# Patient Record
Sex: FEMALE | Race: ASIAN | NOT HISPANIC OR LATINO
[De-identification: names, ages, dates, MRNs, and addresses within clinical notes are randomized per-mention and may not be internally consistent; named-entity substitution may affect disease eponyms.]

---

## 2017-07-24 ENCOUNTER — APPOINTMENT (OUTPATIENT)
Dept: OBGYN | Facility: CLINIC | Age: 82
End: 2017-07-24

## 2017-07-24 VITALS
HEART RATE: 69 BPM | WEIGHT: 100 LBS | DIASTOLIC BLOOD PRESSURE: 94 MMHG | BODY MASS INDEX: 21.57 KG/M2 | HEIGHT: 57 IN | SYSTOLIC BLOOD PRESSURE: 204 MMHG

## 2017-07-24 DIAGNOSIS — B96.89 ACUTE VAGINITIS: ICD-10-CM

## 2017-07-24 DIAGNOSIS — N76.0 ACUTE VAGINITIS: ICD-10-CM

## 2017-10-30 ENCOUNTER — APPOINTMENT (OUTPATIENT)
Dept: OBGYN | Facility: CLINIC | Age: 82
End: 2017-10-30
Payer: MEDICARE

## 2017-10-30 ENCOUNTER — ASOB RESULT (OUTPATIENT)
Age: 82
End: 2017-10-30

## 2017-10-30 DIAGNOSIS — N81.4 UTEROVAGINAL PROLAPSE, UNSPECIFIED: ICD-10-CM

## 2017-10-30 DIAGNOSIS — N39.41 URGE INCONTINENCE: ICD-10-CM

## 2017-10-30 DIAGNOSIS — Z00.00 ENCOUNTER FOR GENERAL ADULT MEDICAL EXAMINATION W/OUT ABNORMAL FINDINGS: ICD-10-CM

## 2017-10-30 DIAGNOSIS — N95.2 POSTMENOPAUSAL ATROPHIC VAGINITIS: ICD-10-CM

## 2017-10-30 PROCEDURE — 99213 OFFICE O/P EST LOW 20 MIN: CPT

## 2017-10-30 PROCEDURE — 76830 TRANSVAGINAL US NON-OB: CPT

## 2018-12-05 ENCOUNTER — INPATIENT (INPATIENT)
Facility: HOSPITAL | Age: 83
LOS: 11 days | Discharge: HOME HEALTH | End: 2018-12-17
Attending: HOSPITALIST | Admitting: HOSPITALIST
Payer: MEDICARE

## 2018-12-05 VITALS
TEMPERATURE: 102 F | HEART RATE: 119 BPM | DIASTOLIC BLOOD PRESSURE: 68 MMHG | SYSTOLIC BLOOD PRESSURE: 129 MMHG | OXYGEN SATURATION: 91 % | RESPIRATION RATE: 20 BRPM

## 2018-12-05 NOTE — ED ADULT TRIAGE NOTE - CHIEF COMPLAINT QUOTE
Pt arrives to ED c/o fevers, chills, generalized weakness. Pt normally a&ox2 (self and location), currently not responding to questions. Pt is awake, appears weak. +Emesis in triage. Hx of HTN. Pt with low sp02, placed on 2L NC.

## 2018-12-06 DIAGNOSIS — Z29.9 ENCOUNTER FOR PROPHYLACTIC MEASURES, UNSPECIFIED: ICD-10-CM

## 2018-12-06 DIAGNOSIS — N30.01 ACUTE CYSTITIS WITH HEMATURIA: ICD-10-CM

## 2018-12-06 DIAGNOSIS — G93.49 OTHER ENCEPHALOPATHY: ICD-10-CM

## 2018-12-06 DIAGNOSIS — D72.825 BANDEMIA: ICD-10-CM

## 2018-12-06 DIAGNOSIS — N17.9 ACUTE KIDNEY FAILURE, UNSPECIFIED: ICD-10-CM

## 2018-12-06 DIAGNOSIS — A41.9 SEPSIS, UNSPECIFIED ORGANISM: ICD-10-CM

## 2018-12-06 LAB
ALBUMIN SERPL ELPH-MCNC: 3.5 G/DL — SIGNIFICANT CHANGE UP (ref 3.3–5)
ALP SERPL-CCNC: 160 U/L — HIGH (ref 40–120)
ALT FLD-CCNC: 13 U/L — SIGNIFICANT CHANGE UP (ref 4–33)
APPEARANCE UR: SIGNIFICANT CHANGE UP
AST SERPL-CCNC: 27 U/L — SIGNIFICANT CHANGE UP (ref 4–32)
B PERT DNA SPEC QL NAA+PROBE: NOT DETECTED — SIGNIFICANT CHANGE UP
BACTERIA # UR AUTO: HIGH
BASE EXCESS BLDV CALC-SCNC: -0.3 MMOL/L — SIGNIFICANT CHANGE UP
BASE EXCESS BLDV CALC-SCNC: -2.5 MMOL/L — SIGNIFICANT CHANGE UP
BASOPHILS # BLD AUTO: 0.02 K/UL — SIGNIFICANT CHANGE UP (ref 0–0.2)
BASOPHILS NFR BLD AUTO: 0.1 % — SIGNIFICANT CHANGE UP (ref 0–2)
BILIRUB SERPL-MCNC: 0.5 MG/DL — SIGNIFICANT CHANGE UP (ref 0.2–1.2)
BILIRUB UR-MCNC: NEGATIVE — SIGNIFICANT CHANGE UP
BLOOD GAS VENOUS - CREATININE: 1.08 MG/DL — SIGNIFICANT CHANGE UP (ref 0.5–1.3)
BLOOD GAS VENOUS - CREATININE: 1.37 MG/DL — HIGH (ref 0.5–1.3)
BLOOD UR QL VISUAL: HIGH
BUN SERPL-MCNC: 35 MG/DL — HIGH (ref 7–23)
C PNEUM DNA SPEC QL NAA+PROBE: NOT DETECTED — SIGNIFICANT CHANGE UP
CALCIUM SERPL-MCNC: 9.8 MG/DL — SIGNIFICANT CHANGE UP (ref 8.4–10.5)
CHLORIDE BLDV-SCNC: 90 MMOL/L — LOW (ref 96–108)
CHLORIDE BLDV-SCNC: 97 MMOL/L — SIGNIFICANT CHANGE UP (ref 96–108)
CHLORIDE SERPL-SCNC: 85 MMOL/L — LOW (ref 98–107)
CHLORIDE UR-SCNC: 43 MMOL/L — SIGNIFICANT CHANGE UP
CO2 SERPL-SCNC: 21 MMOL/L — LOW (ref 22–31)
COLOR SPEC: SIGNIFICANT CHANGE UP
CREAT ?TM UR-MCNC: 39.7 MG/DL — SIGNIFICANT CHANGE UP
CREAT SERPL-MCNC: 1.34 MG/DL — HIGH (ref 0.5–1.3)
EOSINOPHIL # BLD AUTO: 0 K/UL — SIGNIFICANT CHANGE UP (ref 0–0.5)
EOSINOPHIL NFR BLD AUTO: 0 % — SIGNIFICANT CHANGE UP (ref 0–6)
FLUAV H1 2009 PAND RNA SPEC QL NAA+PROBE: NOT DETECTED — SIGNIFICANT CHANGE UP
FLUAV H1 RNA SPEC QL NAA+PROBE: NOT DETECTED — SIGNIFICANT CHANGE UP
FLUAV H3 RNA SPEC QL NAA+PROBE: NOT DETECTED — SIGNIFICANT CHANGE UP
FLUAV SUBTYP SPEC NAA+PROBE: SIGNIFICANT CHANGE UP
FLUBV RNA SPEC QL NAA+PROBE: NOT DETECTED — SIGNIFICANT CHANGE UP
GAS PNL BLDV: 123 MMOL/L — LOW (ref 136–146)
GAS PNL BLDV: 125 MMOL/L — LOW (ref 136–146)
GLUCOSE BLDV-MCNC: 112 — HIGH (ref 70–99)
GLUCOSE BLDV-MCNC: 122 — HIGH (ref 70–99)
GLUCOSE SERPL-MCNC: 102 MG/DL — HIGH (ref 70–99)
GLUCOSE UR-MCNC: NEGATIVE — SIGNIFICANT CHANGE UP
HADV DNA SPEC QL NAA+PROBE: NOT DETECTED — SIGNIFICANT CHANGE UP
HCO3 BLDV-SCNC: 22 MMOL/L — SIGNIFICANT CHANGE UP (ref 20–27)
HCO3 BLDV-SCNC: 24 MMOL/L — SIGNIFICANT CHANGE UP (ref 20–27)
HCOV PNL SPEC NAA+PROBE: SIGNIFICANT CHANGE UP
HCT VFR BLD CALC: 36 % — SIGNIFICANT CHANGE UP (ref 34.5–45)
HCT VFR BLDV CALC: 33.1 % — LOW (ref 34.5–45)
HCT VFR BLDV CALC: 36.5 % — SIGNIFICANT CHANGE UP (ref 34.5–45)
HGB BLD-MCNC: 11.9 G/DL — SIGNIFICANT CHANGE UP (ref 11.5–15.5)
HGB BLDV-MCNC: 10.7 G/DL — LOW (ref 11.5–15.5)
HGB BLDV-MCNC: 11.9 G/DL — SIGNIFICANT CHANGE UP (ref 11.5–15.5)
HMPV RNA SPEC QL NAA+PROBE: NOT DETECTED — SIGNIFICANT CHANGE UP
HPIV1 RNA SPEC QL NAA+PROBE: NOT DETECTED — SIGNIFICANT CHANGE UP
HPIV2 RNA SPEC QL NAA+PROBE: NOT DETECTED — SIGNIFICANT CHANGE UP
HPIV3 RNA SPEC QL NAA+PROBE: NOT DETECTED — SIGNIFICANT CHANGE UP
HPIV4 RNA SPEC QL NAA+PROBE: NOT DETECTED — SIGNIFICANT CHANGE UP
HYALINE CASTS # UR AUTO: HIGH
IMM GRANULOCYTES # BLD AUTO: 0.13 # — SIGNIFICANT CHANGE UP
IMM GRANULOCYTES NFR BLD AUTO: 0.9 % — SIGNIFICANT CHANGE UP (ref 0–1.5)
KETONES UR-MCNC: NEGATIVE — SIGNIFICANT CHANGE UP
LACTATE BLDV-MCNC: 2.4 MMOL/L — HIGH (ref 0.5–2)
LACTATE BLDV-MCNC: 3.7 MMOL/L — HIGH (ref 0.5–2)
LEUKOCYTE ESTERASE UR-ACNC: SIGNIFICANT CHANGE UP
LYMPHOCYTES # BLD AUTO: 0.17 K/UL — LOW (ref 1–3.3)
LYMPHOCYTES # BLD AUTO: 1.1 % — LOW (ref 13–44)
MCHC RBC-ENTMCNC: 22.7 PG — LOW (ref 27–34)
MCHC RBC-ENTMCNC: 33.1 % — SIGNIFICANT CHANGE UP (ref 32–36)
MCV RBC AUTO: 68.7 FL — LOW (ref 80–100)
METHOD TYPE: SIGNIFICANT CHANGE UP
MONOCYTES # BLD AUTO: 0.22 K/UL — SIGNIFICANT CHANGE UP (ref 0–0.9)
MONOCYTES NFR BLD AUTO: 1.5 % — LOW (ref 2–14)
NEUTROPHILS # BLD AUTO: 14.26 K/UL — HIGH (ref 1.8–7.4)
NEUTROPHILS NFR BLD AUTO: 96.4 % — HIGH (ref 43–77)
NITRITE UR-MCNC: POSITIVE — HIGH
NRBC # FLD: 0 — SIGNIFICANT CHANGE UP
ORGANISM # SPEC MICROSCOPIC CNT: SIGNIFICANT CHANGE UP
ORGANISM # SPEC MICROSCOPIC CNT: SIGNIFICANT CHANGE UP
PCO2 BLDV: 37 MMHG — LOW (ref 41–51)
PCO2 BLDV: 42 MMHG — SIGNIFICANT CHANGE UP (ref 41–51)
PH BLDV: 7.35 PH — SIGNIFICANT CHANGE UP (ref 7.32–7.43)
PH BLDV: 7.43 PH — SIGNIFICANT CHANGE UP (ref 7.32–7.43)
PH UR: 6 — SIGNIFICANT CHANGE UP (ref 5–8)
PLATELET # BLD AUTO: 162 K/UL — SIGNIFICANT CHANGE UP (ref 150–400)
PMV BLD: 10.2 FL — SIGNIFICANT CHANGE UP (ref 7–13)
PO2 BLDV: 44 MMHG — HIGH (ref 35–40)
PO2 BLDV: 57 MMHG — HIGH (ref 35–40)
POTASSIUM BLDV-SCNC: 3.2 MMOL/L — LOW (ref 3.4–4.5)
POTASSIUM BLDV-SCNC: 3.6 MMOL/L — SIGNIFICANT CHANGE UP (ref 3.4–4.5)
POTASSIUM SERPL-MCNC: 3.7 MMOL/L — SIGNIFICANT CHANGE UP (ref 3.5–5.3)
POTASSIUM SERPL-SCNC: 3.7 MMOL/L — SIGNIFICANT CHANGE UP (ref 3.5–5.3)
POTASSIUM UR-SCNC: 31.7 MMOL/L — SIGNIFICANT CHANGE UP
PROT SERPL-MCNC: 6.9 G/DL — SIGNIFICANT CHANGE UP (ref 6–8.3)
PROT UR-MCNC: 200 — HIGH
RBC # BLD: 5.24 M/UL — HIGH (ref 3.8–5.2)
RBC # FLD: 14.1 % — SIGNIFICANT CHANGE UP (ref 10.3–14.5)
RBC CASTS # UR COMP ASSIST: HIGH (ref 0–?)
RSV RNA SPEC QL NAA+PROBE: NOT DETECTED — SIGNIFICANT CHANGE UP
RV+EV RNA SPEC QL NAA+PROBE: NOT DETECTED — SIGNIFICANT CHANGE UP
SAO2 % BLDV: 74.2 % — SIGNIFICANT CHANGE UP (ref 60–85)
SAO2 % BLDV: 89.8 % — HIGH (ref 60–85)
SODIUM SERPL-SCNC: 124 MMOL/L — LOW (ref 135–145)
SODIUM UR-SCNC: 30 MMOL/L — SIGNIFICANT CHANGE UP
SP GR SPEC: 1.01 — SIGNIFICANT CHANGE UP (ref 1–1.04)
SPECIMEN SOURCE: SIGNIFICANT CHANGE UP
SPECIMEN SOURCE: SIGNIFICANT CHANGE UP
SQUAMOUS # UR AUTO: SIGNIFICANT CHANGE UP
UROBILINOGEN FLD QL: NORMAL — SIGNIFICANT CHANGE UP
WBC # BLD: 14.8 K/UL — HIGH (ref 3.8–10.5)
WBC # FLD AUTO: 14.8 K/UL — HIGH (ref 3.8–10.5)
WBC UR QL: >50 — HIGH (ref 0–?)

## 2018-12-06 PROCEDURE — 71045 X-RAY EXAM CHEST 1 VIEW: CPT | Mod: 26

## 2018-12-06 PROCEDURE — 99223 1ST HOSP IP/OBS HIGH 75: CPT

## 2018-12-06 RX ORDER — VANCOMYCIN HCL 1 G
1000 VIAL (EA) INTRAVENOUS ONCE
Qty: 0 | Refills: 0 | Status: COMPLETED | OUTPATIENT
Start: 2018-12-06 | End: 2018-12-06

## 2018-12-06 RX ORDER — ACETAMINOPHEN 500 MG
650 TABLET ORAL ONCE
Qty: 0 | Refills: 0 | Status: COMPLETED | OUTPATIENT
Start: 2018-12-06 | End: 2018-12-06

## 2018-12-06 RX ORDER — SODIUM CHLORIDE 9 MG/ML
1850 INJECTION INTRAMUSCULAR; INTRAVENOUS; SUBCUTANEOUS ONCE
Qty: 0 | Refills: 0 | Status: COMPLETED | OUTPATIENT
Start: 2018-12-06 | End: 2018-12-06

## 2018-12-06 RX ORDER — PIPERACILLIN AND TAZOBACTAM 4; .5 G/20ML; G/20ML
3.38 INJECTION, POWDER, LYOPHILIZED, FOR SOLUTION INTRAVENOUS ONCE
Qty: 0 | Refills: 0 | Status: COMPLETED | OUTPATIENT
Start: 2018-12-06 | End: 2018-12-06

## 2018-12-06 RX ORDER — ENOXAPARIN SODIUM 100 MG/ML
30 INJECTION SUBCUTANEOUS EVERY 24 HOURS
Qty: 0 | Refills: 0 | Status: DISCONTINUED | OUTPATIENT
Start: 2018-12-06 | End: 2018-12-17

## 2018-12-06 RX ORDER — SODIUM CHLORIDE 9 MG/ML
1000 INJECTION INTRAMUSCULAR; INTRAVENOUS; SUBCUTANEOUS
Qty: 0 | Refills: 0 | Status: DISCONTINUED | OUTPATIENT
Start: 2018-12-06 | End: 2018-12-08

## 2018-12-06 RX ORDER — CEFTRIAXONE 500 MG/1
1 INJECTION, POWDER, FOR SOLUTION INTRAMUSCULAR; INTRAVENOUS EVERY 24 HOURS
Qty: 0 | Refills: 0 | Status: DISCONTINUED | OUTPATIENT
Start: 2018-12-06 | End: 2018-12-07

## 2018-12-06 RX ADMIN — CEFTRIAXONE 100 GRAM(S): 500 INJECTION, POWDER, FOR SOLUTION INTRAMUSCULAR; INTRAVENOUS at 12:22

## 2018-12-06 RX ADMIN — Medication 650 MILLIGRAM(S): at 03:39

## 2018-12-06 RX ADMIN — SODIUM CHLORIDE 50 MILLILITER(S): 9 INJECTION INTRAMUSCULAR; INTRAVENOUS; SUBCUTANEOUS at 19:58

## 2018-12-06 RX ADMIN — Medication 250 MILLIGRAM(S): at 02:00

## 2018-12-06 RX ADMIN — Medication 650 MILLIGRAM(S): at 01:00

## 2018-12-06 RX ADMIN — PIPERACILLIN AND TAZOBACTAM 3.38 GRAM(S): 4; .5 INJECTION, POWDER, LYOPHILIZED, FOR SOLUTION INTRAVENOUS at 05:39

## 2018-12-06 RX ADMIN — PIPERACILLIN AND TAZOBACTAM 200 GRAM(S): 4; .5 INJECTION, POWDER, LYOPHILIZED, FOR SOLUTION INTRAVENOUS at 01:00

## 2018-12-06 RX ADMIN — SODIUM CHLORIDE 1850 MILLILITER(S): 9 INJECTION INTRAMUSCULAR; INTRAVENOUS; SUBCUTANEOUS at 01:16

## 2018-12-06 RX ADMIN — ENOXAPARIN SODIUM 30 MILLIGRAM(S): 100 INJECTION SUBCUTANEOUS at 18:56

## 2018-12-06 NOTE — ED PROVIDER NOTE - MEDICAL DECISION MAKING DETAILS
89f pw fever, weakness, ams, likely infectious, no recent falls, no sick contacts, Sepsis at this time with unknown source, possibly pna, hypoxia, tachycardia fever, ams, will do labs, fluids, abx, ua cxr, admit

## 2018-12-06 NOTE — ED ADULT NURSE NOTE - OBJECTIVE STATEMENT
break coverage RN- brought in by family for increased fevers and weakness since Monday- pt arrives to room 11 awake, alert, able to follow commands, baseline mental status as per family but more lethargic and slow to answer, vitally stable on supplemental O2- pt straight catheterized for urine, IV 20g placed to left wrist, skin intact. pending EDMD evaluation, will continue to monitor

## 2018-12-06 NOTE — INPATIENT CERTIFICATION FOR MEDICARE PATIENTS - THE STATUS OF COMORBIDITIES.
Relayed recommendations to patient. Patient is okay with trying Adderall XR 20 mg /day. Patient is aware Dr. Natty Elder is not in office today. Patient is okay with waiting until 2/7 for script. Prescription pended for signature. 2. The status of comorbities. (See ED/admit documents)

## 2018-12-06 NOTE — ED PROVIDER NOTE - ATTENDING CONTRIBUTION TO CARE
pt with AMS and tachycardia with fever - meeting SIRS.  Per report had some coryza but no abd pain or cough.  No known sick contacts    Gen: Ill appearing in NAD  Head: NC/AT  Neck: trachea midline  Resp:  No distress CTAB  CV: tachy RR  Ext: no deformities  Neuro:  Alert but somnolent appears non focal  Skin:  Warm and dry as visualized    Pt with SIRS in the setting of AMS.  Broad spectrum ABx given empirically along with 30ml/kg IVF.  Cultures drawn.  repeat lactate ordered for after IVF.  Will require admission

## 2018-12-06 NOTE — H&P ADULT - ASSESSMENT
Very pleasant 89 y/o female brought in by family members due sepsis and acute infectious encephalopathy secondary to a UTI.

## 2018-12-06 NOTE — CHART NOTE - NSCHARTNOTEFT_GEN_A_CORE
Pt with positive blood cultures growing E. coli. Spoke with attending, will continue with CTX.     ADS  [id number] Pt with positive blood cultures growing E. coli. Spoke with attending, will continue with CTX.     ID consulted for management. Will see pt.     ADS  [id number]

## 2018-12-06 NOTE — ED PROVIDER NOTE - OBJECTIVE STATEMENT
89f pmh HTN presents with son and daughter for weakness and fevers since Monday. patient unable to provide history, provided by son. patient lives at home with  and family, normally ambulates with walker. Since Monday evening patient has had increased weakness and fevers, chills. At 9am patient had episode of tonic clonic shaking lasting 30min. Patient after became more weak and is now only responding to name. 2x episodes of emesis in triage, hypoxic to 91 placed on 2L NC sat 100%

## 2018-12-06 NOTE — ED ADULT NURSE NOTE - NSIMPLEMENTINTERV_GEN_ALL_ED
Implemented All Fall with Harm Risk Interventions:  [place] to call system. Call bell, personal items and telephone within reach. Instruct patient to call for assistance. Room bathroom lighting operational. Non-slip footwear when patient is off stretcher. Physically safe environment: no spills, clutter or unnecessary equipment. Stretcher in lowest position, wheels locked, appropriate side rails in place. Provide visual cue, wrist band, yellow gown, etc. Monitor gait and stability. Monitor for mental status changes and reorient to person, place, and time. Review medications for side effects contributing to fall risk. Reinforce activity limits and safety measures with patient and family. Provide visual clues: red socks.

## 2018-12-06 NOTE — H&P ADULT - PROBLEM SELECTOR PLAN 1
Likely a result of the underlying UTI  Rocephin ordered  Follow up cultures and sensitivities  Monitor vitals

## 2018-12-06 NOTE — H&P ADULT - HISTORY OF PRESENT ILLNESS
HPI:  Joe Laguerre is a very pleasant 89 year old Lady, Chinese speaking who was brought in by family complaining of weakness and fevers since Monday night. Family members at bedside stated that she was well all day on Monday, and suddenly took a turn late around midneight. She had told family, that she feels weak, ill, with her body hurting. She also had some shaking with alteration in mental status. The patient is generally alert and oriented to her name, family members and location and remembers past events and at this time appears back at baseline. She is generally not incontinent, but because of her slow ambulation she is unable to make in time to the bathroom. She denies burning with urination, shortness of breath, palpitations or chest pain.  Translation provided by family members at bedside.      PAST MEDICAL & SURGICAL HISTORY:  No significant past surgical history      Review of Systems:   CONSTITUTIONAL: +fevers and fatigue at home spends most of her time at home or in bed  EYES: No eye pain, visual disturbances, or discharge  ENMT:  No difficulty hearing, tinnitus, vertigo; No sinus or throat pain  NECK: No pain or stiffness  RESPIRATORY: No cough, wheezing, chills or hemoptysis; No shortness of breath at this time.  CARDIOVASCULAR: No chest pain, palpitations, dizziness, or leg swelling  GASTROINTESTINAL: No abdominal or epigastric pain. No nausea, vomiting, or hematemesis; No diarrhea or constipation. No melena or hematochezia.  GENITOURINARY: No dysuria, frequency, hematuria, or incontinence  NEUROLOGICAL: No headaches, memory loss, numbness, or tremors. Loss of strength, non acute.  SKIN: No itching, burning, rashes, or lesions   LYMPH NODES: No enlarged glands  ENDOCRINE: No acute changes in heat or cold intolerance   MUSCULOSKELETAL: No joint pain or swelling; No muscle, back, or extremity pain  PSYCHIATRIC: No depression, anxiety, mood swings, or difficulty sleeping  HEME/LYMPH: No easy bruising, or bleeding gums  ALLERGY AND IMMUNOLOGIC: No hives or eczema    Allergies    No Known Allergies    Intolerances        Social History:   Lives with family, no hx of smoking, drinking or drug abuse    FAMILY HISTORY:  No familial history of recurrent UTI's        MEDICATIONS  (STANDING):  cefTRIAXone   IVPB 1 Gram(s) IV Intermittent every 24 hours  enoxaparin Injectable 30 milliGRAM(s) SubCutaneous every 24 hours    MEDICATIONS  (PRN):      T(C): 36.8 (18 @ 10:07), Max: 38.7 (18 @ 23:25)  HR: 79 (18 @ 10:07) (74 - 119)  BP: 120/54 (18 @ 10:07) (103/56 - 129/68)  RR: 17 (18 @ 10:07) (17 - 20)  SpO2: 100% (18 @ 10:07) (91% - 100%)  Height (cm): 144.78 (18 @ 06:26)  Weight (kg): 45.5 (18 @ 06:26)  BMI (kg/m2): 21.7 (18 @ 06:26)  BSA (m2): 1.34 (18 @ 06:26)  CAPILLARY BLOOD GLUCOSE        I&O's Summary      PHYSICAL EXAM:  GENERAL: NAD, well-developed, pleasant  HEAD:  Atraumatic, Normocephalic  EYES: EOMI, PERRLA, conjunctiva and sclera clear  NECK: Supple, No elevated JVD  CHEST/LUNG: Clear to auscultation bilaterally; No wheeze  HEART: Regular rate and rhythm; No murmurs, rubs, or gallops  ABDOMEN: Soft, Nontender, Nondistended; Bowel sounds present  EXTREMITIES:  2+ Peripheral Pulses, No clubbing, cyanosis, or edema  PSYCH: AAOx3  NEUROLOGY: CN II-XII grossly intact, moving all extremities  SKIN: No rashes or lesions    LABS:                        11.9   14.80 )-----------( 162      ( 06 Dec 2018 00:25 )             36.0         124<L>  |  85<L>  |  35<H>  ----------------------------<  102<H>  3.7   |  21<L>  |  1.34<H>    Ca    9.8      06 Dec 2018 00:25    TPro  6.9  /  Alb  3.5  /  TBili  0.5  /  DBili  x   /  AST  27  /  ALT  13  /  AlkPhos  160<H>            Urinalysis Basic - ( 06 Dec 2018 00:40 )    Color: LIGHT ORANGE / Appearance: TURBID / S.014 / pH: 6.0  Gluc: NEGATIVE / Ketone: NEGATIVE  / Bili: NEGATIVE / Urobili: NORMAL   Blood: MODERATE / Protein: 200 / Nitrite: POSITIVE   Leuk Esterase: LARGE / RBC: 11-25 / WBC >50   Sq Epi: FEW / Non Sq Epi: x / Bacteria: MODERATE          RADIOLOGY & ADDITIONAL TESTS:    ECG Personally Reviewed -     Imaging Personally Reviewed: CXR reviewed, no acute pathologies    Consultant(s) Notes Reviewed:      Care Discussed with Consultants/Other Providers: HPI:  Joe Laguerre is a very pleasant 89 year old Lady, Chinese speaking who was brought in by family complaining of weakness and fevers since Monday night. Family members at bedside stated that she was well all day on Monday, and suddenly took a turn late around midneight. She had told family, that she feels weak, ill, with her body hurting. She also had some shaking with changes in mental status. The patient is generally alert and oriented to her name, family members and location. She is generally not incontinent, but because of her slow ambulation she is unable to make in time to the bathroom. She denies burning with urination, shortness of breath, palpitations or chest pain.  Translation provided by family members at bedside.      PAST MEDICAL & SURGICAL HISTORY:  No significant past surgical history      Review of Systems:   CONSTITUTIONAL: +fevers and fatigue at home spends most of her time at home or in bed  EYES: No eye pain, visual disturbances, or discharge  ENMT:  No difficulty hearing, tinnitus, vertigo; No sinus or throat pain  NECK: No pain or stiffness  RESPIRATORY: No cough, wheezing, chills or hemoptysis; No shortness of breath at this time.  CARDIOVASCULAR: No chest pain, palpitations, dizziness, or leg swelling  GASTROINTESTINAL: No abdominal or epigastric pain. No nausea, vomiting, or hematemesis; No diarrhea or constipation. No melena or hematochezia.  GENITOURINARY: No dysuria, frequency, hematuria, or incontinence  NEUROLOGICAL: No headaches, memory loss, numbness, or tremors. Loss of strength, non acute.  SKIN: No itching, burning, rashes, or lesions   LYMPH NODES: No enlarged glands  ENDOCRINE: No acute changes in heat or cold intolerance   MUSCULOSKELETAL: No joint pain or swelling; No muscle, back, or extremity pain  PSYCHIATRIC: No depression, anxiety, mood swings, or difficulty sleeping  HEME/LYMPH: No easy bruising, or bleeding gums  ALLERGY AND IMMUNOLOGIC: No hives or eczema    Allergies    No Known Allergies    Intolerances        Social History:   Lives with family, no hx of smoking, drinking or drug abuse    FAMILY HISTORY:  No familial history of recurrent UTI's        MEDICATIONS  (STANDING):  cefTRIAXone   IVPB 1 Gram(s) IV Intermittent every 24 hours  enoxaparin Injectable 30 milliGRAM(s) SubCutaneous every 24 hours    MEDICATIONS  (PRN):      T(C): 36.8 (18 @ 10:07), Max: 38.7 (18 @ 23:25)  HR: 79 (18 @ 10:07) (74 - 119)  BP: 120/54 (18 @ 10:07) (103/56 - 129/68)  RR: 17 (18 @ 10:07) (17 - 20)  SpO2: 100% (18 @ 10:07) (91% - 100%)  Height (cm): 144.78 (18 @ 06:26)  Weight (kg): 45.5 (18 @ 06:26)  BMI (kg/m2): 21.7 (18 @ :26)  BSA (m2): 1.34 (18 @ 06:26)  CAPILLARY BLOOD GLUCOSE        I&O's Summary      PHYSICAL EXAM:  GENERAL: NAD, well-developed, pleasant  HEAD:  Atraumatic, Normocephalic  EYES: EOMI, PERRLA, conjunctiva and sclera clear  NECK: Supple, No elevated JVD  CHEST/LUNG: Clear to auscultation bilaterally; No wheeze  HEART: Regular rate and rhythm; No murmurs, rubs, or gallops  ABDOMEN: Soft, Nontender, Nondistended; Bowel sounds present  EXTREMITIES:  2+ Peripheral Pulses, No clubbing, cyanosis, or edema  PSYCH: AAOx3  NEUROLOGY: CN II-XII grossly intact, moving all extremities  SKIN: No rashes or lesions    LABS:                        11.9   14.80 )-----------( 162      ( 06 Dec 2018 00:25 )             36.0         124<L>  |  85<L>  |  35<H>  ----------------------------<  102<H>  3.7   |  21<L>  |  1.34<H>    Ca    9.8      06 Dec 2018 00:25    TPro  6.9  /  Alb  3.5  /  TBili  0.5  /  DBili  x   /  AST  27  /  ALT  13  /  AlkPhos  160<H>  12          Urinalysis Basic - ( 06 Dec 2018 00:40 )    Color: LIGHT ORANGE / Appearance: TURBID / S.014 / pH: 6.0  Gluc: NEGATIVE / Ketone: NEGATIVE  / Bili: NEGATIVE / Urobili: NORMAL   Blood: MODERATE / Protein: 200 / Nitrite: POSITIVE   Leuk Esterase: LARGE / RBC: 11-25 / WBC >50   Sq Epi: FEW / Non Sq Epi: x / Bacteria: MODERATE          RADIOLOGY & ADDITIONAL TESTS:    ECG Personally Reviewed -     Imaging Personally Reviewed: CXR reviewed, no acute pathologies    Consultant(s) Notes Reviewed:      Care Discussed with Consultants/Other Providers:

## 2018-12-06 NOTE — ED PROVIDER NOTE - PHYSICAL EXAMINATION
Physical Exam:  Gen: NAD, AOx1, lethargic   Head: NCAT  HEENT: EOMI, PEERLA, normal conjunctiva, tongue midline, oral mucosa dry  Lung: CTAB, no respiratory distress, no wheezes/rhonchi/rales B/L  CV: tachycardic, reg rhythm, no murmurs, rubs or gallops  Abd: soft, NTND, no guarding, no rigidity, no CVA tenderness   MSK: no visible deformities  Skin: Warm, well perfused, no rash  Psych: normal affect, calm  ~Win Van D.O. -Resident

## 2018-12-06 NOTE — H&P ADULT - PROBLEM SELECTOR PLAN 4
Likely a result of the infection  will follow up cbc Likely a result of the UTI  Urine sodium and Cr ordered  If does not correct with UTI treatment, will consider renal u/s

## 2018-12-06 NOTE — ED PROVIDER NOTE - NS ED ROS FT
ROS provided by son  ROS:  GENERAL: + fever, + chills  CARDIAC: no chest pain  PULMONARY: no cough, no shortness of breath  GI: no abdominal pain, no nausea, no vomiting, no diarrhea, no constipation  : No dysuria, no frequency, no change in appearance, or odor of urine  SKIN: no rashes  NEURO: no headache, + weakness  MSK: No joint pain  ~Win Van D.O. -Resident

## 2018-12-06 NOTE — ED ADULT NURSE REASSESSMENT NOTE - NS ED NURSE REASSESS COMMENT FT1
Pt is AOX4 as per family. Pt is Cantonese speaking, family translating at bedside. pt family reports pt is at baseline as of now. Vancomycin infusing, RVP sent, will continue to monitor.

## 2018-12-07 ENCOUNTER — TRANSCRIPTION ENCOUNTER (OUTPATIENT)
Age: 83
End: 2018-12-07

## 2018-12-07 DIAGNOSIS — D72.829 ELEVATED WHITE BLOOD CELL COUNT, UNSPECIFIED: ICD-10-CM

## 2018-12-07 DIAGNOSIS — R50.81 FEVER PRESENTING WITH CONDITIONS CLASSIFIED ELSEWHERE: ICD-10-CM

## 2018-12-07 DIAGNOSIS — N39.0 URINARY TRACT INFECTION, SITE NOT SPECIFIED: ICD-10-CM

## 2018-12-07 DIAGNOSIS — E87.8 OTHER DISORDERS OF ELECTROLYTE AND FLUID BALANCE, NOT ELSEWHERE CLASSIFIED: ICD-10-CM

## 2018-12-07 DIAGNOSIS — A41.51 SEPSIS DUE TO ESCHERICHIA COLI [E. COLI]: ICD-10-CM

## 2018-12-07 DIAGNOSIS — R78.81 BACTEREMIA: ICD-10-CM

## 2018-12-07 LAB
BUN SERPL-MCNC: 14 MG/DL — SIGNIFICANT CHANGE UP (ref 7–23)
CALCIUM SERPL-MCNC: 9.1 MG/DL — SIGNIFICANT CHANGE UP (ref 8.4–10.5)
CHLORIDE SERPL-SCNC: 94 MMOL/L — LOW (ref 98–107)
CO2 SERPL-SCNC: 21 MMOL/L — LOW (ref 22–31)
CREAT SERPL-MCNC: 0.67 MG/DL — SIGNIFICANT CHANGE UP (ref 0.5–1.3)
GLUCOSE SERPL-MCNC: 88 MG/DL — SIGNIFICANT CHANGE UP (ref 70–99)
HCT VFR BLD CALC: 34.4 % — LOW (ref 34.5–45)
HGB BLD-MCNC: 11.6 G/DL — SIGNIFICANT CHANGE UP (ref 11.5–15.5)
MAGNESIUM SERPL-MCNC: 1.9 MG/DL — SIGNIFICANT CHANGE UP (ref 1.6–2.6)
MCHC RBC-ENTMCNC: 22.7 PG — LOW (ref 27–34)
MCHC RBC-ENTMCNC: 33.7 % — SIGNIFICANT CHANGE UP (ref 32–36)
MCV RBC AUTO: 67.5 FL — LOW (ref 80–100)
NRBC # FLD: 0 — SIGNIFICANT CHANGE UP
ORGANISM # SPEC MICROSCOPIC CNT: SIGNIFICANT CHANGE UP
PHOSPHATE SERPL-MCNC: 2.7 MG/DL — SIGNIFICANT CHANGE UP (ref 2.5–4.5)
PLATELET # BLD AUTO: 204 K/UL — SIGNIFICANT CHANGE UP (ref 150–400)
PMV BLD: 11.6 FL — SIGNIFICANT CHANGE UP (ref 7–13)
POTASSIUM SERPL-MCNC: 3.4 MMOL/L — LOW (ref 3.5–5.3)
POTASSIUM SERPL-SCNC: 3.4 MMOL/L — LOW (ref 3.5–5.3)
RBC # BLD: 5.1 M/UL — SIGNIFICANT CHANGE UP (ref 3.8–5.2)
RBC # FLD: 14.3 % — SIGNIFICANT CHANGE UP (ref 10.3–14.5)
SODIUM SERPL-SCNC: 131 MMOL/L — LOW (ref 135–145)
SPECIMEN SOURCE: SIGNIFICANT CHANGE UP
WBC # BLD: 28.49 K/UL — HIGH (ref 3.8–10.5)
WBC # FLD AUTO: 28.49 K/UL — HIGH (ref 3.8–10.5)

## 2018-12-07 PROCEDURE — 99223 1ST HOSP IP/OBS HIGH 75: CPT

## 2018-12-07 PROCEDURE — 99233 SBSQ HOSP IP/OBS HIGH 50: CPT

## 2018-12-07 PROCEDURE — 93306 TTE W/DOPPLER COMPLETE: CPT | Mod: 26

## 2018-12-07 RX ORDER — POTASSIUM CHLORIDE 20 MEQ
40 PACKET (EA) ORAL ONCE
Qty: 0 | Refills: 0 | Status: COMPLETED | OUTPATIENT
Start: 2018-12-07 | End: 2018-12-07

## 2018-12-07 RX ORDER — ATENOLOL 25 MG/1
50 TABLET ORAL DAILY
Qty: 0 | Refills: 0 | Status: DISCONTINUED | OUTPATIENT
Start: 2018-12-07 | End: 2018-12-09

## 2018-12-07 RX ORDER — HYDRALAZINE HCL 50 MG
5 TABLET ORAL ONCE
Qty: 0 | Refills: 0 | Status: COMPLETED | OUTPATIENT
Start: 2018-12-07 | End: 2018-12-07

## 2018-12-07 RX ORDER — ERTAPENEM SODIUM 1 G/1
1000 INJECTION, POWDER, LYOPHILIZED, FOR SOLUTION INTRAMUSCULAR; INTRAVENOUS EVERY 24 HOURS
Qty: 0 | Refills: 0 | Status: DISCONTINUED | OUTPATIENT
Start: 2018-12-07 | End: 2018-12-09

## 2018-12-07 RX ADMIN — ERTAPENEM SODIUM 120 MILLIGRAM(S): 1 INJECTION, POWDER, LYOPHILIZED, FOR SOLUTION INTRAMUSCULAR; INTRAVENOUS at 15:12

## 2018-12-07 RX ADMIN — SODIUM CHLORIDE 50 MILLILITER(S): 9 INJECTION INTRAMUSCULAR; INTRAVENOUS; SUBCUTANEOUS at 08:45

## 2018-12-07 RX ADMIN — Medication 5 MILLIGRAM(S): at 15:12

## 2018-12-07 RX ADMIN — Medication 40 MILLIEQUIVALENT(S): at 08:44

## 2018-12-07 RX ADMIN — ENOXAPARIN SODIUM 30 MILLIGRAM(S): 100 INJECTION SUBCUTANEOUS at 17:28

## 2018-12-07 NOTE — DISCHARGE NOTE ADULT - PATIENT PORTAL LINK FT
You can access the [facility], offered by [facility], by registering with the following website: [email or web address]

## 2018-12-07 NOTE — DISCHARGE NOTE ADULT - PLAN OF CARE
You were seen by Infectious Disease You were seen by Pulmonary in-hospital. Continue with inhalers as recommended. Monitor for shortness of breath, chest pain, palpitations, cough. Follow up outpatient Pulmonary at [address] [place] [address], [address], [phone]. Call to make an appointment. You were seen by Infectious Disease which recommended ceftriaxone. However you had an episode of ileus with vomitting, and due to concern for aspiration, you were switched to ertapenem. In order to prevent further disease progression, continue to follow recommendations made by your primary provider/nephrologist. Continue a diet that is low in sodium and avoid foods that are concentrated in potassium and phosphorus. Continue your medications/supplementations as directed and avoid over-the-counter drugs that are harmful to kidneys, such as, Non-Steroidal Anti-Inflammatory Drugs (NSAIDs). Follow-up as outpatient to monitor your kidney function, as well as, vitamin D, Calcium, potassium, and phosphorus levels. You were seen by Pulmonary in-hospital. Continue with inhalers as recommended. Monitor for shortness of breath, chest pain, palpitations, cough. Follow up outpatient Pulmonary at [address] [place] [address], [address], [phone]. Call to make an appointment. You were found to have ileus. GI evaluated you and recommended that you had improved, and AXR did not show signs of ileus. Diet advanced and now tolerating. likely 2/2 sepsis, now improved Stable Your blood cultures grew E coli, and you were bacteremic likely 2/2 urinary tract infection. You were seen by Infectious Disease which recommended ceftriaxone. However you had an episode of ileus with vomitting, and due to concern for aspiration, you were switched to ertapenem. You were seen by Pulmonary in-hospital. Continue with inhalers as recommended. Monitor for shortness of breath, chest pain, palpitations, cough. Follow up outpatient Pulmonary at [address] [place] [address], [address], (phone number: [phone]). Call to make an appointment. Your blood cultures grew E coli, and you were bacteremic likely secondary to urinary tract infection. You were seen by Infectious Disease who recommended ceftriaxone. However you had an episode of ileus with vomiting, and due to concern for aspiration, you were switched to ertapenem and completed your course. Monitor for any further signs and symptoms of further infection, including but not limited to, fevers/chills, shortness of breath, increased heart rate, dizziness, or abrupt changes in mental status. Creatinine returned to baseline upon discharge - Please follow-up with your primary care provider as outpatient for further monitoring You were seen by Pulmonary in-hospital. Continue with inhalers as recommended. Monitor for shortness of breath, chest pain, palpitations, cough. Follow up outpatient Pulmonary at [address] [place] [address] [address] (phone number: [phone]). Call to make an appointment. You were found to have ileus. Gastrointestinal team was consulted and your diet was advanced slowly. Likely secondary to active infection - Now improved - Continue to monitor mental status. Continue with iron supplementation as directed and follow-up with your primary care provider for repeat bloodwork to further monitor Continue blood pressure medication regimen as directed. Monitor for any visual changes, headaches or dizziness.  Monitor blood pressure regularly.  Follow up with your PCP for further management for high blood pressure.

## 2018-12-07 NOTE — DISCHARGE NOTE ADULT - HOSPITAL COURSE
Sepsis due to Escherichia coli  -Sepsis 2/2 E. Coli bacteremia likely from UTI  -BCx 12/7 & UCx growing E. coli, inconsistent sensitivities; repeat BCx 12/7 negative   -CT C/A/P - mild cardiomegaly, small B/L pleural effusions with passive atelectasis of the B/L lower lobes. No bowel obstruction. Age indeterminate compression deformities of T8, T11, L3, L4, and L5.  - 12/13: ID recs: dc'd ceftriaxone start invanz for ?aspiration given abd distension, ileus, vomiting    Ileus:   CXR: Distended air-filled esophagus and small hiatus hernia. Prominent gas-filled bowel loops seen in the abdomen. Please see report  of abdominal x-rays obtained at the same time for further detail.Hazy opacity at the left base, likely a small pleural effusion with passive atelectasis seen on the CT scan, although infection is not excluded.  Abdominal x-ray: Nonspecific bowel gas pattern similar in appearance to prior CT without evidence of free air.   -abdominal distended, bladder scan <50 cc, AXR pre-apodaca showing possible ileus, placed on CLD, if pt with further vomiting, change to NPO   -12/12 Pt noted with one episode of vomiting small amount placed on NPO  - GI consulted: symptoms resolved, Abdominal xray showed gas-filled loops seen in abdomen, however on our read likely very minimal.  -GI- no EGD at this time  -pt tolerating diet  -Speech and swallow: mech soft with thin liquids     Anemia  -likely 2/2 iron def  -iron supplements started     Cough  -Diffuse wheezing on exam, no Hx of pulmonary disease  -TTE with normal LVSF, no documented diastolic dysfunction,   -CXR negative, RVP negative  -Pulm Cx  -Possible reactive airway disease and/or atelectasis   -[place], OOB to chair when possible, chest PT     Tachycardia   -Multiple EKG shows sinus rhythm       Infectious encephalopathy  -Metabolic encephalopathy due to sepsis 2/2 E. Coli bacteremia likely from UTI    NEREIDA (acute kidney injury)  -Likely in setting of poor PO intake and infection   -Avoid nephrotoxic medications, renally dose medications.     Hyponatremia  -Likely due to poor PO intake   -Fluid restriction     Hypokalemia  -Supplemented    HTN  -12/9 - elevated BP, Atenolol d'leo, Amlodipine started 89 F Chinese speaking who was brought in by family complaining of weakness and fevers    Sepsis due to Escherichia coli  - likely from uti   - BCx 12/6 & UCx growing E. coli, repeat BCx negative   - CT C/A/P - mild cardiomegaly, small B/L pleural effusions with passive atelectasis of the B/L lower lobes. No bowel obstruction. Age indeterminate compression deformities of T8, T11, L3, L4, and L5.  - 12/13L ID recs: Ceftriaxone discontinued and ertapenem started in view of aspiration risk d/t vomitting and abd distention       Ileus  -12/12: Pt noted with one episode of vomiting with abdominal distention, placed on NPO   - CXR: Distended air-filled esophagus and small hiatus hernia. Prominent gas-filled bowel loops seen in the abdomen. Please see report  of abdominal x-rays obtained at the same time for further detail. Hazy opacity at the left base, likely a small pleural effusion with passive atelectasis seen on the CT scan, although infection is not excluded.  - AXR: Nonspecific bowel gas pattern similar in appearance to prior CT without evidence of free air.   - GI consulted: symptoms resolved, Abdominal xray showed gas-filled loops seen in abdomen, however per GI likely very minimal  -GI - no EGD at this time  - pt tolerating diet  - Speech and swallow: mech soft with thin liquids     Anemia  -likely 2/2 iron def  -iron supplements started     Cough  -Diffuse wheezing on exam, no Hx of pulmonary disease  -TTE with normal LVSF, no documented diastolic dysfunction,   -CXR negative, RVP negative  -Pulm Cx  -Possible reactive airway disease and/or atelectasis   -[place], OOB to chair when possible, chest PT     Tachycardia   -Multiple EKG shows sinus rhythm     Infectious encephalopathy  -Metabolic encephalopathy due to sepsis 2/2 E. Coli bacteremia likely from UTI  -resolved    NEREIDA (acute kidney injury)  -Likely in setting of poor PO intake and infection   -Avoid nephrotoxic medications, renally dose medications.     Hyponatremia  -Likely due to poor PO intake     Hypokalemia  -Potassium Supplemented    HTN  -continue with atenolol and amlodipine     Dispo: PT recommending rehab, however family wants to take patient home 89 F Chinese speaking who was brought in by family complaining of weakness and fevers    Sepsis due to Escherichia coli from urine   - BCx 12/7 & UCx growing E. coli, inconsistent sensitivities; repeat BCx 12/7 negative   - CT C/A/P - mild cardiomegaly, small B/L pleural effusions with passive atelectasis of the B/L lower lobes. No bowel obstruction. Age indeterminate compression deformities of T8, T11, L3, L4, and L5.  - Infectious disease consulted: Ceftriaxone switched to Ertapenem      Ileus:   - CXR: Distended air-filled esophagus and small hiatus hernia. Prominent gas-filled bowel loops seen in the abdomen.  - AXR: Nonspecific bowel gas pattern similar in appearance to prior CT without evidence of free air.   - Abdominal distended, bladder scan <50 cc  - GI consulted: Symptoms resolved - Diet advanced   - Speech and swallow: Soft with thin liquids     Anemia  - Likely iron deficient - Supplements started     Cough  - Diffuse wheezing on exam, no Hx of pulmonary disease  - TTE with normal LVSF, no documented diastolic dysfunction,   - CXR negative, RVP negative  - Pulmonology consulted - Inhalers initiated - Outpatient follow-up     Infectious encephalopathy  - Metabolic encephalopathy due to sepsis 2/2 E. Coli bacteremia likely from urinary infection - Improved     NEREIDA (acute kidney injury)  - Likely in setting of poor PO intake and infection - Improved   - Avoid nephrotoxic medications, renally dose medications.     Hyponatremia  - Likely due to poor PO intake - Improved     Hypokalemia  - Supplemented - Improved     HTN  - C/w atenolol and amlodipine     Dispo - Home w/ PT

## 2018-12-07 NOTE — PROGRESS NOTE ADULT - PROBLEM SELECTOR PLAN 4
Hypovolemic hyponatremia, hypokalemia   124->131, within safe range of correction to avoid risk of ODS  Continue IV NS @ 50 cc/hr  Replete K  Monitor BMP for improvement

## 2018-12-07 NOTE — DISCHARGE NOTE ADULT - COMMUNITY RESOURCES
TRANSPORTATION SCHEDULED WITH [facility] [phone].  12/17/2018 5PM PK UP . DOROTHY CONFIRMED TRIP # [id number].

## 2018-12-07 NOTE — DISCHARGE NOTE ADULT - MEDICATION SUMMARY - MEDICATIONS TO STOP TAKING
I will STOP taking the medications listed below when I get home from the hospital:    calcitonin nasal 200 intl units/inh spray  -- Instill 1 spray in one nostril once daily

## 2018-12-07 NOTE — PROGRESS NOTE ADULT - SUBJECTIVE AND OBJECTIVE BOX
Patient is a 89y old  Female who presents with a chief complaint of pain and fever (07 Dec 2018 10:06)        SUBJECTIVE / OVERNIGHT EVENTS:      MEDICATIONS  (STANDING):  enoxaparin Injectable 30 milliGRAM(s) SubCutaneous every 24 hours  ertapenem  IVPB 1000 milliGRAM(s) IV Intermittent every 24 hours  sodium chloride 0.9%. 1000 milliLiter(s) (50 mL/Hr) IV Continuous <Continuous>    MEDICATIONS  (PRN):      Vital Signs Last 24 Hrs  T(C): 37.1 (07 Dec 2018 06:05), Max: 37.3 (06 Dec 2018 14:03)  T(F): 98.7 (07 Dec 2018 06:05), Max: 99.1 (06 Dec 2018 14:03)  HR: 100 (07 Dec 2018 06:05) (88 - 109)  BP: 162/87 (07 Dec 2018 06:05) (137/65 - 162/87)  BP(mean): --  RR: 18 (07 Dec 2018 06:05) (17 - 18)  SpO2: 99% (07 Dec 2018 06:05) (95% - 99%)  CAPILLARY BLOOD GLUCOSE        I&O's Summary        PHYSICAL EXAM  GENERAL: NAD, well-developed  HEAD:  Atraumatic, Normocephalic  EYES: EOMI, PERRLA, conjunctiva and sclera clear  NECK: Supple, No JVD  CHEST/LUNG: Clear to auscultation bilaterally; No wheeze  HEART: Regular rate and rhythm; No murmurs, rubs, or gallops  ABDOMEN: Soft, Nontender, Nondistended; Bowel sounds present  EXTREMITIES:  2+ Peripheral Pulses, No clubbing, cyanosis, or edema  PSYCH: AAOx3  SKIN: No rashes or lesions    LABS:                        11.6   28.49 )-----------( 204      ( 07 Dec 2018 06:00 )             34.4     12-07    131<L>  |  94<L>  |  14  ----------------------------<  88  3.4<L>   |  21<L>  |  0.67    Ca    9.1      07 Dec 2018 06:00  Phos  2.7     12-  Mg     1.9     12-    TPro  6.9  /  Alb  3.5  /  TBili  0.5  /  DBili  x   /  AST  27  /  ALT  13  /  AlkPhos  160<H>  12-06          Urinalysis Basic - ( 06 Dec 2018 00:40 )    Color: LIGHT ORANGE / Appearance: TURBID / S.014 / pH: 6.0  Gluc: NEGATIVE / Ketone: NEGATIVE  / Bili: NEGATIVE / Urobili: NORMAL   Blood: MODERATE / Protein: 200 / Nitrite: POSITIVE   Leuk Esterase: LARGE / RBC: 11-25 / WBC >50   Sq Epi: FEW / Non Sq Epi: x / Bacteria: MODERATE        Culture - Urine (collected 06 Dec 2018 01:21)  Source: URINE CATHETER  Preliminary Report (07 Dec 2018 07:59):    EC^Escherichia coli    COLONY COUNT: > = 100,000 CFU/ML    Culture - Blood (collected 06 Dec 2018 00:56)  Source: BLOOD VENOUS  Preliminary Report (07 Dec 2018 11:52):    EC^Escherichia coli    Culture - Blood (collected 06 Dec 2018 00:56)  Source: BLOOD PERIPHERAL  Preliminary Report (06 Dec 2018 16:45):    ***Blood Panel PCR results on this specimen are available    approximately 3 hours after the Gram stain result***    Gram stain, PCR, and/or culture results may not always    correspond due to difference in methodologies    ------------------------------------------------------------    This PCR assay was performed using [employer].  The    following targets are tested for:  Enterococcus, vancomycin    resistant enterococci, Listeria monocytogenes,  coagulase    negative staphylococci, S. aureus, methicillin resistant S.    aureus, Streptococcus agalactiae (Group B), S. pneumoniae,    S. pyogenes (Group A), Acinetobacter baumannii, Enterobacter    cloacae, E. coli, Klebsiella oxytoca, K. pneumoniae, Proteus    sp., Serratia marcescens, Haemophilus influenzae, Neisseria    meningitidis, Pseudomonas aeruginosa, Candida albicans, C.    glabrata, C. krusei, C. parapsilosis, C. tropicalis and the    KPC resistance gene.    **NOTE: Due to technical problems, Proteus sp. will NOT be    reported as part of the BCID paneluntil further notice.  Organism: BLOOD CULTURE PCR  Escherichia coli (07 Dec 2018 11:56)  Organism: Escherichia coli (07 Dec 2018 11:56)  Organism: BLOOD CULTURE PCR (06 Dec 2018 16:45)        RADIOLOGY & ADDITIONAL TESTS:    Imaging Personally Reviewed:  Consultant(s) Notes Reviewed:    Care Discussed with Consultants/Other Providers: Patient is a 89y old  Female who presents with a chief complaint of pain and fever (07 Dec 2018 10:06)        SUBJECTIVE / OVERNIGHT EVENTS:  Patient seen with her son at bedside denying any complaints. No N/V/D. No dysuria or abdominal pain.     MEDICATIONS  (STANDING):  enoxaparin Injectable 30 milliGRAM(s) SubCutaneous every 24 hours  ertapenem  IVPB 1000 milliGRAM(s) IV Intermittent every 24 hours  sodium chloride 0.9%. 1000 milliLiter(s) (50 mL/Hr) IV Continuous <Continuous>    MEDICATIONS  (PRN):      Vital Signs Last 24 Hrs  T(C): 37.1 (07 Dec 2018 06:05), Max: 37.3 (06 Dec 2018 14:03)  T(F): 98.7 (07 Dec 2018 06:05), Max: 99.1 (06 Dec 2018 14:03)  HR: 100 (07 Dec 2018 06:05) (88 - 109)  BP: 162/87 (07 Dec 2018 06:05) (137/65 - 162/87)  BP(mean): --  RR: 18 (07 Dec 2018 06:05) (17 - 18)  SpO2: 99% (07 Dec 2018 06:05) (95% - 99%)  CAPILLARY BLOOD GLUCOSE        I&O's Summary      PHYSICAL EXAM  GENERAL: Elderly woman laying in bed, cooperative with exam  HEAD:  Atraumatic, Normocephalic  EYES: EOMI, PERRLA, dry MM  NECK: Supple, No JVD  CHEST/LUNG: CTA B/L  HEART: Regular rate and rhythm; No murmurs, rubs, or gallops  ABDOMEN: Soft, Nontender, Nondistended; Bowel sounds present  EXTREMITIES:  2+ Peripheral Pulses, No clubbing, cyanosis, or edema  PSYCH: AAOx3  SKIN: No rashes or lesions      LABS:                        11.6   28.49 )-----------( 204      ( 07 Dec 2018 06:00 )             34.4     12-07    131<L>  |  94<L>  |  14  ----------------------------<  88  3.4<L>   |  21<L>  |  0.67    Ca    9.1      07 Dec 2018 06:00  Phos  2.7     12-  Mg     1.9     12-    TPro  6.9  /  Alb  3.5  /  TBili  0.5  /  DBili  x   /  AST  27  /  ALT  13  /  AlkPhos  160<H>  12-06          Urinalysis Basic - ( 06 Dec 2018 00:40 )    Color: LIGHT ORANGE / Appearance: TURBID / S.014 / pH: 6.0  Gluc: NEGATIVE / Ketone: NEGATIVE  / Bili: NEGATIVE / Urobili: NORMAL   Blood: MODERATE / Protein: 200 / Nitrite: POSITIVE   Leuk Esterase: LARGE / RBC: 11-25 / WBC >50   Sq Epi: FEW / Non Sq Epi: x / Bacteria: MODERATE        Culture - Urine (collected 06 Dec 2018 01:21)  Source: URINE CATHETER  Preliminary Report (07 Dec 2018 07:59):    EC^Escherichia coli    COLONY COUNT: > = 100,000 CFU/ML    Culture - Blood (collected 06 Dec 2018 00:56)  Source: BLOOD VENOUS  Preliminary Report (07 Dec 2018 11:52):    EC^Escherichia coli    Culture - Blood (collected 06 Dec 2018 00:56)  Source: BLOOD PERIPHERAL  Preliminary Report (06 Dec 2018 16:45):    ***Blood Panel PCR results on this specimen are available    approximately 3 hours after the Gram stain result***    Gram stain, PCR, and/or culture results may not always    correspond due to difference in methodologies    ------------------------------------------------------------    This PCR assay was performed using [employer].  The    following targets are tested for:  Enterococcus, vancomycin    resistant enterococci, Listeria monocytogenes,  coagulase    negative staphylococci, S. aureus, methicillin resistant S.    aureus, Streptococcus agalactiae (Group B), S. pneumoniae,    S. pyogenes (Group A), Acinetobacter baumannii, Enterobacter    cloacae, E. coli, Klebsiella oxytoca, K. pneumoniae, Proteus    sp., Serratia marcescens, Haemophilus influenzae, Neisseria    meningitidis, Pseudomonas aeruginosa, Candida albicans, C.    glabrata, C. krusei, C. parapsilosis, C. tropicalis and the    KPC resistance gene.    **NOTE: Due to technical problems, Proteus sp. will NOT be    reported as part of the BCID paneluntil further notice.  Organism: BLOOD CULTURE PCR  Escherichia coli (07 Dec 2018 11:56)  Organism: Escherichia coli (07 Dec 2018 11:56)  Organism: BLOOD CULTURE PCR (06 Dec 2018 16:45)        RADIOLOGY & ADDITIONAL TESTS:    Imaging Personally Reviewed:  Consultant(s) Notes Reviewed:    Care Discussed with Consultants/Other Providers:

## 2018-12-07 NOTE — DISCHARGE NOTE ADULT - SECONDARY DIAGNOSIS.
NEREIDA (acute kidney injury) Acute cystitis with hematuria Cough Abdominal distension Infectious encephalopathy Anemia Hypertension

## 2018-12-07 NOTE — PROGRESS NOTE ADULT - PROBLEM SELECTOR PLAN 1
Sepsis 2/2 E. Coli bacteremia likely from UTI  switch to IV ertapenem to cover empirically for ESBL  Follow blood and urine cx sensitivities, repeat blood cx  ID following, recommendations appreciated

## 2018-12-07 NOTE — PROGRESS NOTE ADULT - PROBLEM SELECTOR PLAN 2
Metabolic encephalopathy due to sepsis 2/2 E. Coli bacteremia likely from UTI  Mental status improving with abx, continue to monitor

## 2018-12-07 NOTE — DISCHARGE NOTE ADULT - ADDITIONAL INSTRUCTIONS
Please follow up with your PCP in 1 week  Please follow up with pulmonary as OP, [address] [address], (phone number: [phone] (please call to make an appt).

## 2018-12-07 NOTE — CONSULT NOTE ADULT - SUBJECTIVE AND OBJECTIVE BOX
ARASH LAGUERRE 89y Female  MRN-[id number]    Patient is a 89y old  Female who presents with a chief complaint of pain and fever (06 Dec 2018 11:10)      HPI:  HPI:  Arash Laguerre is a very pleasant 89 year old Lady, Chinese speaking who was brought in by family complaining of weakness and fevers since Monday night. Family members at bedside stated that she was well all day on Monday, and suddenly took a turn late around midneight. She had told family, that she feels weak, ill, with her body hurting. She also had some shaking with changes in mental status. The patient is generally alert and oriented to her name, family members and location. She is generally not incontinent, but because of her slow ambulation she is unable to make in time to the bathroom. She denies burning with urination, shortness of breath, palpitations or chest pain.  Translation provided by family members at bedside.    Translation/Hx with help of daughter at bedside.    Found to have positive blood cx yesterday with e coli.    No recent abx use before hospitalization     PAST MEDICAL & SURGICAL HISTORY:  HTN    Allergies    No Known Allergies    Intolerances        Social History:   Lives with family, no hx of smoking, drinking or drug abuse    FAMILY HISTORY:  No familial history of recurrent UTI's      ANTIMICROBIALS:  ertapenem  IVPB 1000 every 24 hours      MEDICATIONS  (STANDING):  enoxaparin Injectable 30 milliGRAM(s) SubCutaneous every 24 hours  ertapenem  IVPB 1000 milliGRAM(s) IV Intermittent every 24 hours  sodium chloride 0.9%. 1000 milliLiter(s) (50 mL/Hr) IV Continuous <Continuous>      Vital Signs Last 24 Hrs  T(C): 37.1 (07 Dec 2018 06:05), Max: 37.3 (06 Dec 2018 14:03)  T(F): 98.7 (07 Dec 2018 06:05), Max: 99.1 (06 Dec 2018 14:03)  HR: 100 (07 Dec 2018 06:05) (88 - 109)  BP: 162/87 (07 Dec 2018 06:05) (137/65 - 162/87)  BP(mean): --  RR: 18 (07 Dec 2018 06:05) (17 - 18)  SpO2: 99% (07 Dec 2018 06:05) (95% - 99%)    CBC Full  -  ( 07 Dec 2018 06:00 )  WBC Count : 28.49 K/uL  Hemoglobin : 11.6 g/dL  Hematocrit : 34.4 %  Platelet Count - Automated : 204 K/uL  Mean Cell Volume : 67.5 fL  Mean Cell Hemoglobin : 22.7 pg  Mean Cell Hemoglobin Concentration : 33.7 %  Auto Neutrophil # : x  Auto Lymphocyte # : x  Auto Monocyte # : x  Auto Eosinophil # : x  Auto Basophil # : x  Auto Neutrophil % : x  Auto Lymphocyte % : x  Auto Monocyte % : x  Auto Eosinophil % : x  Auto Basophil % : x        131<L>  |  94<L>  |  14  ----------------------------<  88  3.4<L>   |  21<L>  |  0.67    Ca    9.1      07 Dec 2018 06:00  Phos  2.7       Mg     1.9         TPro  6.9  /  Alb  3.5  /  TBili  0.5  /  DBili  x   /  AST  27  /  ALT  13  /  AlkPhos  160<H>      LIVER FUNCTIONS - ( 06 Dec 2018 00:25 )  Alb: 3.5 g/dL / Pro: 6.9 g/dL / ALK PHOS: 160 u/L / ALT: 13 u/L / AST: 27 u/L / GGT: x           Urinalysis Basic - ( 06 Dec 2018 00:40 )    Color: LIGHT ORANGE / Appearance: TURBID / S.014 / pH: 6.0  Gluc: NEGATIVE / Ketone: NEGATIVE  / Bili: NEGATIVE / Urobili: NORMAL   Blood: MODERATE / Protein: 200 / Nitrite: POSITIVE   Leuk Esterase: LARGE / RBC: 11-25 / WBC >50   Sq Epi: FEW / Non Sq Epi: x / Bacteria: MODERATE        MICROBIOLOGY:  Culture - Urine (18 @ 01:21)    Culture - Urine:   EC^Escherichia coli  COLONY COUNT: > = 100,000 CFU/ML    Specimen Source: URINE CATHETER    Culture - Blood (18 @ 00:56)    Specimen Source: BLOOD VENOUS    Gram Stain Blood:   ***** CRITICAL RESULT *****  PERSON CALLED / READ-BACK: VENESSA ANGULO RN / Y  DATE / TIME CALLED: 18 1621  CALLED BY: VAN MCGINNIS                *******************************                * This is an appended result. *                *******************************  A prior result that was reported as final has been changed.  GNR^Gram Neg Rods  AFTER: 12 HOURS INCUBATION  BOTTLE: AEROBIC   ANAEROBIC BOTTLES    Culture - Blood (18 @ 00:56)    Culture - Blood:   ***Blood Panel PCR results on this specimen are available  approximately 3 hours after the Gram stain result***  Gram stain, PCR, and/or culture results may not always  correspond due to difference in methodologies  ------------------------------------------------------------  This PCR assay was performed using [employer].  The  following targets are tested for:  Enterococcus, vancomycin  resistant enterococci, Listeria monocytogenes,  coagulase  negative staphylococci, S. aureus, methicillin resistant S.  aureus, Streptococcus agalactiae (Group B), S. pneumoniae,  S. pyogenes (Group A), Acinetobacter baumannii, Enterobacter  cloacae, E. coli, Klebsiella oxytoca, K. pneumoniae, Proteus  sp., Serratia marcescens, Haemophilus influenzae, Neisseria  meningitidis, Pseudomonas aeruginosa, Candida albicans, C.  glabrata, C. krusei, C. parapsilosis, C. tropicalis and the  KPC resistance gene.  **NOTE: Due to technical problems, Proteus sp. will NOT be  reported as part of the BCID paneluntil further notice.    -  Escherichia coli: + DETECT CARLOS    Specimen Source: BLOOD PERIPHERAL    Organism: BLOOD CULTURE PCR    Gram Stain Blood:   ***** CRITICAL RESULT *****  PERSON CALLED / READ-BACK: VENESSA ANGULO RN / Y  DATE / TIME CALLED: 18 1615  CALLED BY: VAN MCGINNIS  GNR^Gram Neg Rods  AFTER: 12 HOURS INCUBATION  BOTTLE: AEROBIC   ANAEROBIC BOTTLES    Organism Identification: BLOOD CULTURE PCR    Method Type: PCR      RADIOLOGY    < from: Xray Chest 1 View- PORTABLE-Urgent (18 @ 01:54) >  Clear lungs.

## 2018-12-07 NOTE — PROGRESS NOTE ADULT - PROBLEM SELECTOR PLAN 3
Likely pre-renal, improving with IVF, 1.34->0.67  Continue IV NS @ 50 cc/hr  Monitor BMP, avoid nephrotoxic medications, renally dose medications

## 2018-12-07 NOTE — DISCHARGE NOTE ADULT - MEDICATION SUMMARY - MEDICATIONS TO TAKE
I will START or STAY ON the medications listed below when I get home from the hospital:    Semi-electric hospital bed with gel overlay  -- Indication: For Home care    atenolol 25 mg oral tablet  -- 1 tab(s) by mouth once a day  -- Indication: For Hypertension    tiotropium 18 mcg inhalation capsule  -- 1 cap(s) inhaled once a day  -- Indication: For Pulmonary wheeze    albuterol 90 mcg/inh inhalation aerosol  -- 2 puff(s) inhaled every 6 hours as needed for shortness of breath   -- For inhalation only.  It is very important that you take or use this exactly as directed.  Do not skip doses or discontinue unless directed by your doctor.  Obtain medical advice before taking any non-prescription drugs as some may affect the action of this medication.  Shake well before use.    -- Indication: For SOB    amLODIPine 5 mg oral tablet  -- 1 tab(s) by mouth once a day  -- Indication: For Hypertension    FeroSul 325 mg (65 mg elemental iron) oral tablet  -- 1 tab(s) by mouth once a day   -- Indication: For Anemia    senna oral tablet  -- 2 tab(s) by mouth once a day (at bedtime)  -- Indication: For Constipation    docusate sodium 100 mg oral capsule  -- 1 cap(s) by mouth 2 times a day as needed for constipation   -- Indication: For Constipation

## 2018-12-07 NOTE — DISCHARGE NOTE ADULT - CARE PROVIDER_API CALL
Daniel Klein), Internal Medicine  [address] [place] [address]  Phone: [phone]  Fax: [phone]

## 2018-12-07 NOTE — CONSULT NOTE ADULT - ASSESSMENT
89 year old Lady, Chinese speaking who was brought in by family complaining of weakness and fevers since Monday night with fever, leukocytosis, E coil bacteremia/UTI, sepsis

## 2018-12-07 NOTE — DISCHARGE NOTE ADULT - HOME CARE AGENCY
[facility], Phone [phone].  Initial visit by nurse/therapist will be made day after discharge.  If you have any problems, please contact the Homecare agency directly.

## 2018-12-07 NOTE — DISCHARGE NOTE ADULT - CARE PLAN
Principal Discharge DX:	Bacteremia due to Escherichia coli  Assessment and plan of treatment:	You were seen by Infectious Disease  Secondary Diagnosis:	NEREIDA (acute kidney injury)  Secondary Diagnosis:	Acute cystitis with hematuria Principal Discharge DX:	Bacteremia due to Escherichia coli  Assessment and plan of treatment:	You were seen by Infectious Disease  Secondary Diagnosis:	NEREIDA (acute kidney injury)  Secondary Diagnosis:	Acute cystitis with hematuria  Secondary Diagnosis:	Cough  Assessment and plan of treatment:	You were seen by Pulmonary in-hospital. Continue with inhalers as recommended. Monitor for shortness of breath, chest pain, palpitations, cough. Follow up outpatient Pulmonary at [address] [place] [address], [address], [phone]. Call to make an appointment. Principal Discharge DX:	Bacteremia due to Escherichia coli  Assessment and plan of treatment:	You were seen by Infectious Disease which recommended ceftriaxone. However you had an episode of ileus with vomitting, and due to concern for aspiration, you were switched to ertapenem.  Secondary Diagnosis:	NEREIDA (acute kidney injury)  Assessment and plan of treatment:	In order to prevent further disease progression, continue to follow recommendations made by your primary provider/nephrologist. Continue a diet that is low in sodium and avoid foods that are concentrated in potassium and phosphorus. Continue your medications/supplementations as directed and avoid over-the-counter drugs that are harmful to kidneys, such as, Non-Steroidal Anti-Inflammatory Drugs (NSAIDs). Follow-up as outpatient to monitor your kidney function, as well as, vitamin D, Calcium, potassium, and phosphorus levels.  Secondary Diagnosis:	Cough  Assessment and plan of treatment:	You were seen by Pulmonary in-hospital. Continue with inhalers as recommended. Monitor for shortness of breath, chest pain, palpitations, cough. Follow up outpatient Pulmonary at [address] [place] [address], [address], [phone]. Call to make an appointment.  Secondary Diagnosis:	Abdominal distension  Assessment and plan of treatment:	You were found to have ileus. GI evaluated you and recommended that you had improved, and AXR did not show signs of ileus. Diet advanced and now tolerating.  Secondary Diagnosis:	Infectious encephalopathy  Assessment and plan of treatment:	likely 2/2 sepsis, now improved Principal Discharge DX:	Bacteremia due to Escherichia coli  Goal:	Stable  Assessment and plan of treatment:	Your blood cultures grew E coli, and you were bacteremic likely 2/2 urinary tract infection. You were seen by Infectious Disease which recommended ceftriaxone. However you had an episode of ileus with vomitting, and due to concern for aspiration, you were switched to ertapenem.  Secondary Diagnosis:	NEREIDA (acute kidney injury)  Assessment and plan of treatment:	In order to prevent further disease progression, continue to follow recommendations made by your primary provider/nephrologist. Continue a diet that is low in sodium and avoid foods that are concentrated in potassium and phosphorus. Continue your medications/supplementations as directed and avoid over-the-counter drugs that are harmful to kidneys, such as, Non-Steroidal Anti-Inflammatory Drugs (NSAIDs). Follow-up as outpatient to monitor your kidney function, as well as, vitamin D, Calcium, potassium, and phosphorus levels.  Secondary Diagnosis:	Cough  Assessment and plan of treatment:	You were seen by Pulmonary in-hospital. Continue with inhalers as recommended. Monitor for shortness of breath, chest pain, palpitations, cough. Follow up outpatient Pulmonary at [address] [place] [address], [address], (phone number: [phone]). Call to make an appointment.  Secondary Diagnosis:	Abdominal distension  Assessment and plan of treatment:	You were found to have ileus. GI evaluated you and recommended that you had improved, and AXR did not show signs of ileus. Diet advanced and now tolerating.  Secondary Diagnosis:	Infectious encephalopathy  Assessment and plan of treatment:	likely 2/2 sepsis, now improved Principal Discharge DX:	Bacteremia due to Escherichia coli  Goal:	Stable  Assessment and plan of treatment:	Your blood cultures grew E coli, and you were bacteremic likely secondary to urinary tract infection. You were seen by Infectious Disease who recommended ceftriaxone. However you had an episode of ileus with vomiting, and due to concern for aspiration, you were switched to ertapenem and completed your course. Monitor for any further signs and symptoms of further infection, including but not limited to, fevers/chills, shortness of breath, increased heart rate, dizziness, or abrupt changes in mental status.  Secondary Diagnosis:	NEREIDA (acute kidney injury)  Assessment and plan of treatment:	Creatinine returned to baseline upon discharge - Please follow-up with your primary care provider as outpatient for further monitoring  Secondary Diagnosis:	Cough  Assessment and plan of treatment:	You were seen by Pulmonary in-hospital. Continue with inhalers as recommended. Monitor for shortness of breath, chest pain, palpitations, cough. Follow up outpatient Pulmonary at [address] [place] [address], [address] (phone number: [phone]). Call to make an appointment.  Secondary Diagnosis:	Abdominal distension  Assessment and plan of treatment:	You were found to have ileus. Gastrointestinal team was consulted and your diet was advanced slowly.  Secondary Diagnosis:	Infectious encephalopathy  Assessment and plan of treatment:	Likely secondary to active infection - Now improved - Continue to monitor mental status.  Secondary Diagnosis:	Anemia  Assessment and plan of treatment:	Continue with iron supplementation as directed and follow-up with your primary care provider for repeat bloodwork to further monitor Principal Discharge DX:	Bacteremia due to Escherichia coli  Goal:	Stable  Assessment and plan of treatment:	Your blood cultures grew E coli, and you were bacteremic likely secondary to urinary tract infection. You were seen by Infectious Disease who recommended ceftriaxone. However you had an episode of ileus with vomiting, and due to concern for aspiration, you were switched to ertapenem and completed your course. Monitor for any further signs and symptoms of further infection, including but not limited to, fevers/chills, shortness of breath, increased heart rate, dizziness, or abrupt changes in mental status.  Secondary Diagnosis:	NEREIDA (acute kidney injury)  Assessment and plan of treatment:	Creatinine returned to baseline upon discharge - Please follow-up with your primary care provider as outpatient for further monitoring  Secondary Diagnosis:	Cough  Assessment and plan of treatment:	You were seen by Pulmonary in-hospital. Continue with inhalers as recommended. Monitor for shortness of breath, chest pain, palpitations, cough. Follow up outpatient Pulmonary at [address] [place] [address], [address] (phone number: [phone]). Call to make an appointment.  Secondary Diagnosis:	Abdominal distension  Assessment and plan of treatment:	You were found to have ileus. Gastrointestinal team was consulted and your diet was advanced slowly.  Secondary Diagnosis:	Infectious encephalopathy  Assessment and plan of treatment:	Likely secondary to active infection - Now improved - Continue to monitor mental status.  Secondary Diagnosis:	Anemia  Assessment and plan of treatment:	Continue with iron supplementation as directed and follow-up with your primary care provider for repeat bloodwork to further monitor  Secondary Diagnosis:	Hypertension  Assessment and plan of treatment:	Continue blood pressure medication regimen as directed. Monitor for any visual changes, headaches or dizziness.  Monitor blood pressure regularly.  Follow up with your PCP for further management for high blood pressure.

## 2018-12-08 DIAGNOSIS — E87.1 HYPO-OSMOLALITY AND HYPONATREMIA: ICD-10-CM

## 2018-12-08 DIAGNOSIS — R05 COUGH: ICD-10-CM

## 2018-12-08 DIAGNOSIS — E87.6 HYPOKALEMIA: ICD-10-CM

## 2018-12-08 LAB
-  AMIKACIN: SIGNIFICANT CHANGE UP
-  AMIKACIN: SIGNIFICANT CHANGE UP
-  AMPICILLIN/SULBACTAM: SIGNIFICANT CHANGE UP
-  AMPICILLIN/SULBACTAM: SIGNIFICANT CHANGE UP
-  AMPICILLIN: SIGNIFICANT CHANGE UP
-  AMPICILLIN: SIGNIFICANT CHANGE UP
-  AZTREONAM: SIGNIFICANT CHANGE UP
-  AZTREONAM: SIGNIFICANT CHANGE UP
-  CEFAZOLIN: SIGNIFICANT CHANGE UP
-  CEFAZOLIN: SIGNIFICANT CHANGE UP
-  CEFEPIME: SIGNIFICANT CHANGE UP
-  CEFEPIME: SIGNIFICANT CHANGE UP
-  CEFOXITIN: SIGNIFICANT CHANGE UP
-  CEFOXITIN: SIGNIFICANT CHANGE UP
-  CEFTAZIDIME: SIGNIFICANT CHANGE UP
-  CEFTAZIDIME: SIGNIFICANT CHANGE UP
-  CEFTRIAXONE: SIGNIFICANT CHANGE UP
-  CEFTRIAXONE: SIGNIFICANT CHANGE UP
-  CIPROFLOXACIN: SIGNIFICANT CHANGE UP
-  CIPROFLOXACIN: SIGNIFICANT CHANGE UP
-  ERTAPENEM: SIGNIFICANT CHANGE UP
-  ERTAPENEM: SIGNIFICANT CHANGE UP
-  GENTAMICIN: SIGNIFICANT CHANGE UP
-  GENTAMICIN: SIGNIFICANT CHANGE UP
-  IMIPENEM: SIGNIFICANT CHANGE UP
-  IMIPENEM: SIGNIFICANT CHANGE UP
-  LEVOFLOXACIN: SIGNIFICANT CHANGE UP
-  LEVOFLOXACIN: SIGNIFICANT CHANGE UP
-  MEROPENEM: SIGNIFICANT CHANGE UP
-  MEROPENEM: SIGNIFICANT CHANGE UP
-  NITROFURANTOIN: SIGNIFICANT CHANGE UP
-  PIPERACILLIN/TAZOBACTAM: SIGNIFICANT CHANGE UP
-  PIPERACILLIN/TAZOBACTAM: SIGNIFICANT CHANGE UP
-  TIGECYCLINE: SIGNIFICANT CHANGE UP
-  TIGECYCLINE: SIGNIFICANT CHANGE UP
-  TOBRAMYCIN: SIGNIFICANT CHANGE UP
-  TOBRAMYCIN: SIGNIFICANT CHANGE UP
-  TRIMETHOPRIM/SULFAMETHOXAZOLE: SIGNIFICANT CHANGE UP
-  TRIMETHOPRIM/SULFAMETHOXAZOLE: SIGNIFICANT CHANGE UP
B PERT DNA SPEC QL NAA+PROBE: NOT DETECTED — SIGNIFICANT CHANGE UP
BACTERIA BLD CULT: SIGNIFICANT CHANGE UP
BACTERIA BLD CULT: SIGNIFICANT CHANGE UP
BACTERIA UR CULT: SIGNIFICANT CHANGE UP
BUN SERPL-MCNC: 8 MG/DL — SIGNIFICANT CHANGE UP (ref 7–23)
C PNEUM DNA SPEC QL NAA+PROBE: NOT DETECTED — SIGNIFICANT CHANGE UP
CALCIUM SERPL-MCNC: 8.5 MG/DL — SIGNIFICANT CHANGE UP (ref 8.4–10.5)
CHLORIDE SERPL-SCNC: 95 MMOL/L — LOW (ref 98–107)
CO2 SERPL-SCNC: 22 MMOL/L — SIGNIFICANT CHANGE UP (ref 22–31)
CREAT SERPL-MCNC: 0.56 MG/DL — SIGNIFICANT CHANGE UP (ref 0.5–1.3)
E COLI DNA BLD POS QL NAA+NON-PROBE: SIGNIFICANT CHANGE UP
FLUAV H1 2009 PAND RNA SPEC QL NAA+PROBE: NOT DETECTED — SIGNIFICANT CHANGE UP
FLUAV H1 RNA SPEC QL NAA+PROBE: NOT DETECTED — SIGNIFICANT CHANGE UP
FLUAV H3 RNA SPEC QL NAA+PROBE: NOT DETECTED — SIGNIFICANT CHANGE UP
FLUAV SUBTYP SPEC NAA+PROBE: SIGNIFICANT CHANGE UP
FLUBV RNA SPEC QL NAA+PROBE: NOT DETECTED — SIGNIFICANT CHANGE UP
GLUCOSE SERPL-MCNC: 86 MG/DL — SIGNIFICANT CHANGE UP (ref 70–99)
HADV DNA SPEC QL NAA+PROBE: NOT DETECTED — SIGNIFICANT CHANGE UP
HCOV PNL SPEC NAA+PROBE: SIGNIFICANT CHANGE UP
HCT VFR BLD CALC: 33 % — LOW (ref 34.5–45)
HGB BLD-MCNC: 10.8 G/DL — LOW (ref 11.5–15.5)
HMPV RNA SPEC QL NAA+PROBE: NOT DETECTED — SIGNIFICANT CHANGE UP
HPIV1 RNA SPEC QL NAA+PROBE: NOT DETECTED — SIGNIFICANT CHANGE UP
HPIV2 RNA SPEC QL NAA+PROBE: NOT DETECTED — SIGNIFICANT CHANGE UP
HPIV3 RNA SPEC QL NAA+PROBE: NOT DETECTED — SIGNIFICANT CHANGE UP
HPIV4 RNA SPEC QL NAA+PROBE: NOT DETECTED — SIGNIFICANT CHANGE UP
MAGNESIUM SERPL-MCNC: 1.7 MG/DL — SIGNIFICANT CHANGE UP (ref 1.6–2.6)
MCHC RBC-ENTMCNC: 21.6 PG — LOW (ref 27–34)
MCHC RBC-ENTMCNC: 32.7 % — SIGNIFICANT CHANGE UP (ref 32–36)
MCV RBC AUTO: 66.1 FL — LOW (ref 80–100)
METHOD TYPE: SIGNIFICANT CHANGE UP
METHOD TYPE: SIGNIFICANT CHANGE UP
NRBC # FLD: 0 — SIGNIFICANT CHANGE UP
ORGANISM # SPEC MICROSCOPIC CNT: SIGNIFICANT CHANGE UP
ORGANISM # SPEC MICROSCOPIC CNT: SIGNIFICANT CHANGE UP
PHOSPHATE SERPL-MCNC: 2.1 MG/DL — LOW (ref 2.5–4.5)
PLATELET # BLD AUTO: 158 K/UL — SIGNIFICANT CHANGE UP (ref 150–400)
PMV BLD: SIGNIFICANT CHANGE UP FL (ref 7–13)
POTASSIUM SERPL-MCNC: 3 MMOL/L — LOW (ref 3.5–5.3)
POTASSIUM SERPL-SCNC: 3 MMOL/L — LOW (ref 3.5–5.3)
RBC # BLD: 4.99 M/UL — SIGNIFICANT CHANGE UP (ref 3.8–5.2)
RBC # FLD: 14.4 % — SIGNIFICANT CHANGE UP (ref 10.3–14.5)
RSV RNA SPEC QL NAA+PROBE: NOT DETECTED — SIGNIFICANT CHANGE UP
RV+EV RNA SPEC QL NAA+PROBE: NOT DETECTED — SIGNIFICANT CHANGE UP
SODIUM SERPL-SCNC: 130 MMOL/L — LOW (ref 135–145)
SPECIMEN SOURCE: SIGNIFICANT CHANGE UP
SPECIMEN SOURCE: SIGNIFICANT CHANGE UP
WBC # BLD: 20.82 K/UL — HIGH (ref 3.8–10.5)
WBC # FLD AUTO: 20.82 K/UL — HIGH (ref 3.8–10.5)

## 2018-12-08 PROCEDURE — 99233 SBSQ HOSP IP/OBS HIGH 50: CPT

## 2018-12-08 PROCEDURE — 71045 X-RAY EXAM CHEST 1 VIEW: CPT | Mod: 26

## 2018-12-08 PROCEDURE — 99232 SBSQ HOSP IP/OBS MODERATE 35: CPT

## 2018-12-08 RX ORDER — HYDRALAZINE HCL 50 MG
10 TABLET ORAL ONCE
Qty: 0 | Refills: 0 | Status: COMPLETED | OUTPATIENT
Start: 2018-12-08 | End: 2018-12-08

## 2018-12-08 RX ORDER — POTASSIUM PHOSPHATE, MONOBASIC POTASSIUM PHOSPHATE, DIBASIC 236; 224 MG/ML; MG/ML
30 INJECTION, SOLUTION INTRAVENOUS ONCE
Qty: 0 | Refills: 0 | Status: COMPLETED | OUTPATIENT
Start: 2018-12-08 | End: 2018-12-08

## 2018-12-08 RX ORDER — IPRATROPIUM/ALBUTEROL SULFATE 18-103MCG
3 AEROSOL WITH ADAPTER (GRAM) INHALATION EVERY 4 HOURS
Qty: 0 | Refills: 0 | Status: DISCONTINUED | OUTPATIENT
Start: 2018-12-08 | End: 2018-12-08

## 2018-12-08 RX ORDER — POTASSIUM CHLORIDE 20 MEQ
40 PACKET (EA) ORAL EVERY 4 HOURS
Qty: 0 | Refills: 0 | Status: DISCONTINUED | OUTPATIENT
Start: 2018-12-08 | End: 2018-12-08

## 2018-12-08 RX ORDER — IPRATROPIUM/ALBUTEROL SULFATE 18-103MCG
3 AEROSOL WITH ADAPTER (GRAM) INHALATION EVERY 4 HOURS
Qty: 0 | Refills: 0 | Status: DISCONTINUED | OUTPATIENT
Start: 2018-12-08 | End: 2018-12-10

## 2018-12-08 RX ORDER — ALBUTEROL 90 UG/1
2.5 AEROSOL, METERED ORAL ONCE
Qty: 0 | Refills: 0 | Status: COMPLETED | OUTPATIENT
Start: 2018-12-08 | End: 2018-12-08

## 2018-12-08 RX ORDER — POTASSIUM CHLORIDE 20 MEQ
10 PACKET (EA) ORAL
Qty: 0 | Refills: 0 | Status: COMPLETED | OUTPATIENT
Start: 2018-12-08 | End: 2018-12-08

## 2018-12-08 RX ADMIN — Medication 10 MILLIGRAM(S): at 22:54

## 2018-12-08 RX ADMIN — POTASSIUM PHOSPHATE, MONOBASIC POTASSIUM PHOSPHATE, DIBASIC 85 MILLIMOLE(S): 236; 224 INJECTION, SOLUTION INTRAVENOUS at 15:14

## 2018-12-08 RX ADMIN — Medication 100 MILLIEQUIVALENT(S): at 10:07

## 2018-12-08 RX ADMIN — ERTAPENEM SODIUM 120 MILLIGRAM(S): 1 INJECTION, POWDER, LYOPHILIZED, FOR SOLUTION INTRAMUSCULAR; INTRAVENOUS at 15:13

## 2018-12-08 RX ADMIN — Medication 3 MILLILITER(S): at 13:02

## 2018-12-08 RX ADMIN — ALBUTEROL 2.5 MILLIGRAM(S): 90 AEROSOL, METERED ORAL at 10:53

## 2018-12-08 RX ADMIN — SODIUM CHLORIDE 50 MILLILITER(S): 9 INJECTION INTRAMUSCULAR; INTRAVENOUS; SUBCUTANEOUS at 05:38

## 2018-12-08 RX ADMIN — Medication 100 MILLIEQUIVALENT(S): at 11:18

## 2018-12-08 RX ADMIN — ATENOLOL 50 MILLIGRAM(S): 25 TABLET ORAL at 05:38

## 2018-12-08 RX ADMIN — ENOXAPARIN SODIUM 30 MILLIGRAM(S): 100 INJECTION SUBCUTANEOUS at 17:22

## 2018-12-08 NOTE — PHYSICAL THERAPY INITIAL EVALUATION ADULT - ADDITIONAL COMMENTS
Pt. baseline is minimal ambulation within home with rolling walker.    Patient left supine in bed as received, NAD, call bell in reach, all lines/devices intact, RN aware.

## 2018-12-08 NOTE — PROGRESS NOTE ADULT - PROBLEM SELECTOR PLAN 1
Sepsis 2/2 E. Coli bacteremia likely from UTI  Continue IV ertapenem to cover empirically for ESBL  Follow blood and urine cx sensitivities, repeat blood cx  ID following, recommendations appreciated

## 2018-12-08 NOTE — CONSULT NOTE ADULT - PROBLEM SELECTOR RECOMMENDATION 9
Pt. with likely acute hyponatremia initially due to poor PO intake. Pt. found to have low serum sodium of 124 on admission (12/6/18) which subsequently improved to 131 on 12/7/18 after IVFs. Pt. had slight decrease of serum sodium today (12/8/18) to 130. Patient also with mildly elevated urine sodium of 30. Recommend stopping IVFs. Obtain serum uric acid. Monitor BMP Q6h. Avoid over-correction of serum sodium by 6-8 mEq in 24 hours
-new problem  -from UTI/bacteremia  -f/u final cultures/sensitivities  -invanz 1 gm iv q24  -DC CTX

## 2018-12-08 NOTE — PROGRESS NOTE ADULT - SUBJECTIVE AND OBJECTIVE BOX
CC: Patient is a 89y old  Female who presents with a chief complaint of pain and fever (08 Dec 2018 09:00)    ID following for bacteremia    Interval History/ROS: Auditory wheezing, afebrile. Per daughter at bedside, patient remains with low energy.     Rest of ROS negative.    Allergies  No Known Allergies    ANTIMICROBIALS:  ertapenem  IVPB 1000 every 24 hours    PE:    Vital Signs Last 24 Hrs  T(C): 37 (08 Dec 2018 05:32), Max: 37.6 (07 Dec 2018 14:27)  T(F): 98.6 (08 Dec 2018 05:32), Max: 99.7 (07 Dec 2018 14:27)  HR: 102 (08 Dec 2018 05:32) (100 - 116)  BP: 164/88 (08 Dec 2018 05:32) (158/86 - 198/114)  BP(mean): --  RR: 18 (08 Dec 2018 05:32) (18 - 18)  SpO2: 98% (08 Dec 2018 05:32) (98% - 98%)    Gen: Awake,NAD, auditory wheezing  CV: S1+S2 normal, no murmurs  Resp: Expiratory wheezes  Abd: Soft, nontender, +BS  Ext: No LE edema, no wounds  : No Young  IV/Skin: No thrombophlebitis      LABS:                          10.8   20.82 )-----------( 158      ( 08 Dec 2018 06:42 )             33.0       12-08    130<L>  |  95<L>  |  8   ----------------------------<  86  3.0<L>   |  22  |  0.56    Ca    8.5      08 Dec 2018 06:42  Phos  2.1     12-08  Mg     1.7     12-08    MICROBIOLOGY:  v  BLOOD  12-07-18 --  --  --      URINE CATHETER  12-06-18 --  --  --      BLOOD PERIPHERAL  12-06-18 --  --  BLOOD CULTURE PCR  Escherichia coli    RADIOLOGY:    < from: Xray Chest 1 View- PORTABLE-Urgent (12.06.18 @ 01:54) >  IMPRESSION:   Clear lungs.    < end of copied text >

## 2018-12-08 NOTE — CONSULT NOTE ADULT - SUBJECTIVE AND OBJECTIVE BOX
[facility] DIVISION OF KIDNEY DISEASES AND HYPERTENSION -- INITIAL CONSULT NOTE  --------------------------------------------------------------------------------  HPI: 89 yo F with history of HTN is admitted to [facility] with altered mental status 2/2 E. Coli UTI/bacteremia. On admission (12/6/18) patient was found to have decreased serum sodium of 124, which subsequently improved to 131 on 12/7/18 after IVFs, however slightly worsened to 130 today (12/8/18). Patient also found to have elevated Scr of 1.34 on admission (12/6/18), which is now 0.56 today (12/8/18). Patient found to have low serum potassium of 3.0 today (12/8/18). Hence, nephrology consulted.    Patient seen and examined this afternoon in the presence of son and daughter-in-law. Most history is provided by son, medical team, and EMR. Son states that the patient had developed fever and decreased appetite with poor PO intake on 12/6/18 prior to being admitted. Denies has patient having history of hyponatremia on outpatient labs. Patient found to be hypertensive on 12/8/18 after receiving IVFs. Patient admits to mild fatigue. Patient currently denies CP, SOB, N/V/F/C.    PAST HISTORY  --------------------------------------------------------------------------------  PAST MEDICAL & SURGICAL HISTORY:  HTN    FAMILY HISTORY:  Denies family history of kidney disease    PAST SOCIAL HISTORY:  Denies alcohol, tobacco, and illicit drugs    ALLERGIES & MEDICATIONS  --------------------------------------------------------------------------------  Allergies    No Known Allergies    Intolerances    Standing Inpatient Medications  ATENolol  Tablet 50 milliGRAM(s) Oral daily  enoxaparin Injectable 30 milliGRAM(s) SubCutaneous every 24 hours  ertapenem  IVPB 1000 milliGRAM(s) IV Intermittent every 24 hours    REVIEW OF SYSTEMS  --------------------------------------------------------------------------------  Gen: + lethargy, + fatigue  Respiratory: No dyspnea  CV: No chest pain  GI: No abdominal pain  MSK: No LE edema  Neuro: No dizziness  Heme: No bleeding  Psych: No depression  Skin: No rash    All other systems were reviewed and are negative, except as noted.    VITALS/PHYSICAL EXAM  --------------------------------------------------------------------------------  T(C): 37.1 (12-08-18 @ 14:13), Max: 37.1 (12-08-18 @ 14:13)  HR: 95 (12-08-18 @ 14:13) (68 - 102)  BP: 168/88 (12-08-18 @ 15:12) (164/88 - 183/86)  RR: 18 (12-08-18 @ 14:13) (18 - 18)  SpO2: 99% (12-08-18 @ 14:13) (98% - 99%)  Wt(kg): --    Physical Exam:  	Gen: elderly female in NAD  	HEENT: Supple neck  	Pulm: CTA B/L  	CV: RRR, S1S2; no rub  	Abd: +BS, soft, nontender/nondistended  	: No suprapubic tenderness  	LE: No edema  	Skin: Warm, without rashes  	Psych: Normal affect    LABS/STUDIES  --------------------------------------------------------------------------------              10.8   20.82 >-----------<  158      [12-08-18 @ 06:42]              33.0     130  |  95  |  8   ----------------------------<  86      [12-08-18 @ 06:42]  3.0   |  22  |  0.56        Ca     8.5     [12-08-18 @ 06:42]      Mg     1.7     [12-08-18 @ 06:42]      Phos  2.1     [12-08-18 @ 06:42]    Creatinine Trend:  SCr 0.56 [12-08 @ 06:42]  SCr 0.67 [12-07 @ 06:00]  SCr 1.34 [12-06 @ 00:25]    Urine Creatinine 39.70      [12-06-18 @ 18:10]  Urine Sodium 30      [12-06-18 @ 18:10]  Urine Potassium 31.7      [12-06-18 @ 18:10]  Urine Chloride 43      [12-06-18 @ 18:10] [facility] DIVISION OF KIDNEY DISEASES AND HYPERTENSION -- INITIAL CONSULT NOTE  --------------------------------------------------------------------------------  HPI: 89 yo F with history of HTN is admitted to [facility] with altered mental status 2/2 E. Coli UTI/bacteremia. On admission (12/6/18) patient was found to have decreased serum sodium of 124, which subsequently improved to 131 on 12/7/18 after IVFs, however slightly worsened to 130 today (12/8/18). Patient also found to have elevated Scr of 1.34 on admission (12/6/18), which is now 0.56 today (12/8/18). Patient found to have low serum potassium of 3.0 today (12/8/18). Hence, nephrology consulted.    Patient seen and examined this afternoon in the presence of son and daughter-in-law. Most history is provided by son, medical team, and EMR. Son states that the patient had developed fever and decreased appetite with poor PO intake on 12/6/18 prior to being admitted. Denies has patient having history of hyponatremia on outpatient labs. Patient found to be hypertensive on 12/8/18 after receiving IVFs. Patient admits to mild fatigue. Patient currently denies CP, SOB, N/V/F/C.    PAST HISTORY  --------------------------------------------------------------------------------  PAST MEDICAL & SURGICAL HISTORY:  HTN    FAMILY HISTORY:  Denies family history of kidney disease    PAST SOCIAL HISTORY:  Denies alcohol, tobacco, and illicit drugs    ALLERGIES & MEDICATIONS  --------------------------------------------------------------------------------  Allergies    No Known Allergies    Intolerances    Standing Inpatient Medications  ATENolol  Tablet 50 milliGRAM(s) Oral daily  enoxaparin Injectable 30 milliGRAM(s) SubCutaneous every 24 hours  ertapenem  IVPB 1000 milliGRAM(s) IV Intermittent every 24 hours    REVIEW OF SYSTEMS  --------------------------------------------------------------------------------  Gen: + lethargy, + fatigue  Respiratory: No dyspnea  CV: No chest pain  GI: No abdominal pain  MSK: No LE edema  Neuro: No dizziness  Heme: No bleeding  Psych: No depression  Skin: No rash    All other systems were reviewed and are negative, except as noted.    VITALS/PHYSICAL EXAM  --------------------------------------------------------------------------------  T(C): 37.1 (12-08-18 @ 14:13), Max: 37.1 (12-08-18 @ 14:13)  HR: 95 (12-08-18 @ 14:13) (68 - 102)  BP: 168/88 (12-08-18 @ 15:12) (164/88 - 183/86)  RR: 18 (12-08-18 @ 14:13) (18 - 18)  SpO2: 99% (12-08-18 @ 14:13) (98% - 99%)  Wt(kg): --    Physical Exam:  	Gen: elderly female in NAD  	HEENT: Supple neck  	Pulm: CTA B/L  	CV: RRR, S1S2; no rub  	Abd: +BS, soft, nontender/nondistended  	: No suprapubic tenderness  	LE: No edema  	Skin: Warm, without rashes  	Psych: Normal affect    LABS/STUDIES  --------------------------------------------------------------------------------              10.8   20.82 >-----------<  158      [12-08-18 @ 06:42]              33.0     130  |  95  |  8   ----------------------------<  86      [12-08-18 @ 06:42]  3.0   |  22  |  0.56        Ca     8.5     [12-08-18 @ 06:42]      Mg     1.7     [12-08-18 @ 06:42]      Phos  2.1     [12-08-18 @ 06:42]    Creatinine Trend:  SCr 0.56 [12-08 @ 06:42]  SCr 0.67 [12-07 @ 06:00]  SCr 1.34 [12-06 @ 00:25]    Urine Creatinine 39.70      [12-06-18 @ 18:10]  Urine Sodium 30      [12-06-18 @ 18:10]  Urine Potassium 31.7      [12-06-18 @ 18:10]  Urine Chloride 43      [12-06-18 @ 18:10]

## 2018-12-08 NOTE — PHYSICAL THERAPY INITIAL EVALUATION ADULT - LEVEL OF INDEPENDENCE: SUPINE/SIT, REHAB EVAL
unable to perform/Pt. family deferring at this time, despite education and encouragement from PT. PT will follow.

## 2018-12-08 NOTE — PROGRESS NOTE ADULT - PROBLEM SELECTOR PLAN 5
Lovenox 30mg daily for dvt prophylaxis Hyponatremia, initially suspected hypovolemic   124->131 with IVF, today 130; urine lytes obtained during IVF administration  Continue IV NS @ 50 cc/hr for now  Nephrology consult, recommendations appreciated    Hypokalemia; ? renal loss with urine K 31  Continue to replete K  Nephrology consult, recommendations appreciated  Monitor BMP for improvement Hyponatremia, suspected hypovolemic   124->131 with IVF, today 130; urine lytes obtained during IVF administration  DC IVF to avoid exacerbating hypokalemia  Nephrology consult, recommendations appreciated    Hypokalemia; likely renal loss with IVF, urine K 31  Continue to replete K, DC IVF as above  Nephrology consult, recommendations appreciated  Monitor BMP for improvement

## 2018-12-08 NOTE — PROGRESS NOTE ADULT - PROBLEM SELECTOR PLAN 4
Hyponatremia, initially suspected hypovolemic   124->131 with IVF, today 130; urine lytes obtained during IVF administration  Continue IV NS @ 50 cc/hr for now  Nephrology consult, recommendations appreciated    Hypokalemia; ? renal loss with urine K 31  Continue to replete K  Nephrology consult, recommendations appreciated  Monitor BMP for improvement Likely pre-renal, improving with IVF, 1.34->0.67  Continue IV NS @ 50 cc/hr  Monitor BMP, avoid nephrotoxic medications, renally dose medications Likely pre-renal, improved with IVF  DC IVF   Monitor BMP, avoid nephrotoxic medications, renally dose medications

## 2018-12-08 NOTE — PROGRESS NOTE ADULT - PROBLEM SELECTOR PLAN 3
Likely pre-renal, improving with IVF, 1.34->0.67  Continue IV NS @ 50 cc/hr  Monitor BMP, avoid nephrotoxic medications, renally dose medications Diffuse wheezing on exam, no hx of pulmonary disease, TTE with normal LVSF, no documented diastolic dysfunction, infection less likely as patient remains afebrile with improvement in leukocytosis, possible URI  F/U CXR  F/U RVP, Flu  Conservative management for now, nebs q4 Diffuse wheezing on exam, no hx of pulmonary disease, TTE with normal LVSF, no documented diastolic dysfunction, infection less likely as patient remains afebrile with improvement in leukocytosis, possible URI  F/U CXR  F/U repeat RVP  Conservative management for now, nebs q4

## 2018-12-08 NOTE — PHYSICAL THERAPY INITIAL EVALUATION ADULT - GENERAL OBSERVATIONS, REHAB EVAL
Consult received, chart reviewed. Patient received supine in bed, NAD, + family present to assist translation.

## 2018-12-08 NOTE — PHYSICAL THERAPY INITIAL EVALUATION ADULT - PATIENT/FAMILY/SIGNIFICANT OTHER GOALS STATEMENT, PT EVAL
Family bedside is deferring PT at this time, prefers pt. to rest, despite education and encouragement from PT.

## 2018-12-08 NOTE — CONSULT NOTE ADULT - PROBLEM SELECTOR RECOMMENDATION 2
Pt. with hypokalemia in the setting of increased urine output. Pt. with low serum potassium of 3.0 today (12/8/18). Patient also with mildly elevated urine potassium of 31.7. Replete potassium as needed. Check serum potassium tonight. Monitor serum potassium
-better  -due to uti/bacteremia

## 2018-12-08 NOTE — CONSULT NOTE ADULT - PROBLEM SELECTOR RECOMMENDATION 3
Pt. with likely hemodynamically mediated NEREIDA in the setting of poor PO intake and infection. Pt. with elevated Scr of 1.34 on admission (12/6/18) with subsequent improvement to 0.56 after IVFs today (12/8/18). NEREIDA resolved. Monitor BMP and UOP
-worsened  -prob from infection  -cont abx

## 2018-12-08 NOTE — PROGRESS NOTE ADULT - SUBJECTIVE AND OBJECTIVE BOX
Patient is a 89y old  Female who presents with a chief complaint of pain and fever (07 Dec 2018 16:57)        SUBJECTIVE / OVERNIGHT EVENTS:      MEDICATIONS  (STANDING):  ATENolol  Tablet 50 milliGRAM(s) Oral daily  enoxaparin Injectable 30 milliGRAM(s) SubCutaneous every 24 hours  ertapenem  IVPB 1000 milliGRAM(s) IV Intermittent every 24 hours  potassium chloride  10 mEq/100 mL IVPB 10 milliEquivalent(s) IV Intermittent every 1 hour  potassium phosphate IVPB 30 milliMole(s) IV Intermittent once  sodium chloride 0.9%. 1000 milliLiter(s) (50 mL/Hr) IV Continuous <Continuous>    MEDICATIONS  (PRN):      Vital Signs Last 24 Hrs  T(C): 37 (08 Dec 2018 05:32), Max: 37.6 (07 Dec 2018 14:27)  T(F): 98.6 (08 Dec 2018 05:32), Max: 99.7 (07 Dec 2018 14:27)  HR: 102 (08 Dec 2018 05:32) (100 - 116)  BP: 164/88 (08 Dec 2018 05:32) (158/86 - 198/114)  BP(mean): --  RR: 18 (08 Dec 2018 05:32) (18 - 18)  SpO2: 98% (08 Dec 2018 05:32) (98% - 98%)  CAPILLARY BLOOD GLUCOSE        I&O's Summary        PHYSICAL EXAM  GENERAL: NAD, well-developed  HEAD:  Atraumatic, Normocephalic  EYES: EOMI, PERRLA, conjunctiva and sclera clear  NECK: Supple, No JVD  CHEST/LUNG: Clear to auscultation bilaterally; No wheeze  HEART: Regular rate and rhythm; No murmurs, rubs, or gallops  ABDOMEN: Soft, Nontender, Nondistended; Bowel sounds present  EXTREMITIES:  2+ Peripheral Pulses, No clubbing, cyanosis, or edema  PSYCH: AAOx3  SKIN: No rashes or lesions    LABS:                        10.8   20.82 )-----------( 158      ( 08 Dec 2018 06:42 )             33.0     12-08    130<L>  |  95<L>  |  8   ----------------------------<  86  3.0<L>   |  22  |  0.56    Ca    8.5      08 Dec 2018 06:42  Phos  2.1     12-08  Mg     1.7     12-08                Culture - Blood (collected 07 Dec 2018 06:17)  Source: BLOOD PERIPHERAL  Preliminary Report (08 Dec 2018 06:16):    NO ORGANISMS ISOLATED    NO ORGANISMS ISOLATED AT 24 HOURS    Culture - Blood (collected 07 Dec 2018 06:17)  Source: BLOOD  Preliminary Report (08 Dec 2018 06:16):    NO ORGANISMS ISOLATED    NO ORGANISMS ISOLATED AT 24 HOURS    Culture - Urine (collected 06 Dec 2018 01:21)  Source: URINE CATHETER  Preliminary Report (07 Dec 2018 07:59):    EC^Escherichia coli    COLONY COUNT: > = 100,000 CFU/ML    Culture - Blood (collected 06 Dec 2018 00:56)  Source: BLOOD VENOUS  Preliminary Report (07 Dec 2018 11:52):    EC^Escherichia coli    Culture - Blood (collected 06 Dec 2018 00:56)  Source: BLOOD PERIPHERAL  Preliminary Report (06 Dec 2018 16:45):    ***Blood Panel PCR results on this specimen are available    approximately 3 hours after the Gram stain result***    Gram stain, PCR, and/or culture results may not always    correspond due to difference in methodologies    ------------------------------------------------------------    This PCR assay was performed using [employer].  The    following targets are tested for:  Enterococcus, vancomycin    resistant enterococci, Listeria monocytogenes,  coagulase    negative staphylococci, S. aureus, methicillin resistant S.    aureus, Streptococcus agalactiae (Group B), S. pneumoniae,    S. pyogenes (Group A), Acinetobacter baumannii, Enterobacter    cloacae, E. coli, Klebsiella oxytoca, K. pneumoniae, Proteus    sp., Serratia marcescens, Haemophilus influenzae, Neisseria    meningitidis, Pseudomonas aeruginosa, Candida albicans, C.    glabrata, C. krusei, C. parapsilosis, C. tropicalis and the    KPC resistance gene.    **NOTE: Due to technical problems, Proteus sp. will NOT be    reported as part of the BCID paneluntil further notice.  Organism: BLOOD CULTURE PCR  Escherichia coli (07 Dec 2018 11:56)  Organism: Escherichia coli (07 Dec 2018 11:56)  Organism: BLOOD CULTURE PCR (06 Dec 2018 16:45)        RADIOLOGY & ADDITIONAL TESTS:    Imaging Personally Reviewed:  Consultant(s) Notes Reviewed:    Care Discussed with Consultants/Other Providers: Patient is a 89y old  Female who presents with a chief complaint of pain and fever (07 Dec 2018 16:57)      SUBJECTIVE / OVERNIGHT EVENTS:  Patient was seen with the daughter at bedside, patient with new non-productive cough this morning,     MEDICATIONS  (STANDING):  ATENolol  Tablet 50 milliGRAM(s) Oral daily  enoxaparin Injectable 30 milliGRAM(s) SubCutaneous every 24 hours  ertapenem  IVPB 1000 milliGRAM(s) IV Intermittent every 24 hours  potassium chloride  10 mEq/100 mL IVPB 10 milliEquivalent(s) IV Intermittent every 1 hour  potassium phosphate IVPB 30 milliMole(s) IV Intermittent once  sodium chloride 0.9%. 1000 milliLiter(s) (50 mL/Hr) IV Continuous <Continuous>    MEDICATIONS  (PRN):      Vital Signs Last 24 Hrs  T(C): 37 (08 Dec 2018 05:32), Max: 37.6 (07 Dec 2018 14:27)  T(F): 98.6 (08 Dec 2018 05:32), Max: 99.7 (07 Dec 2018 14:27)  HR: 102 (08 Dec 2018 05:32) (100 - 116)  BP: 164/88 (08 Dec 2018 05:32) (158/86 - 198/114)  BP(mean): --  RR: 18 (08 Dec 2018 05:32) (18 - 18)  SpO2: 98% (08 Dec 2018 05:32) (98% - 98%)  CAPILLARY BLOOD GLUCOSE        I&O's Summary        PHYSICAL EXAM  GENERAL: Elderly woman laying in bed, appears uncomfortable, cooperative with exam  HEAD:  Atraumatic, Normocephalic  EYES: EOMI, PERRLA, conjunctiva and sclera clear  NECK: Supple, No JVD  CHEST/LUNG: Diffuse wheezing B/L  HEART: Regular rate and rhythm; No murmurs, rubs, or gallops  ABDOMEN: Soft, Nontender, Nondistended; Bowel sounds present  EXTREMITIES:  2+ Peripheral Pulses, No clubbing, cyanosis, or edema  PSYCH: AAOx3  SKIN: No rashes or lesions    LABS:                        10.8   20.82 )-----------( 158      ( 08 Dec 2018 06:42 )             33.0     12-08    130<L>  |  95<L>  |  8   ----------------------------<  86  3.0<L>   |  22  |  0.56    Ca    8.5      08 Dec 2018 06:42  Phos  2.1     12-08  Mg     1.7     12-08                Culture - Blood (collected 07 Dec 2018 06:17)  Source: BLOOD PERIPHERAL  Preliminary Report (08 Dec 2018 06:16):    NO ORGANISMS ISOLATED    NO ORGANISMS ISOLATED AT 24 HOURS    Culture - Blood (collected 07 Dec 2018 06:17)  Source: BLOOD  Preliminary Report (08 Dec 2018 06:16):    NO ORGANISMS ISOLATED    NO ORGANISMS ISOLATED AT 24 HOURS    Culture - Urine (collected 06 Dec 2018 01:21)  Source: URINE CATHETER  Preliminary Report (07 Dec 2018 07:59):    EC^Escherichia coli    COLONY COUNT: > = 100,000 CFU/ML    Culture - Blood (collected 06 Dec 2018 00:56)  Source: BLOOD VENOUS  Preliminary Report (07 Dec 2018 11:52):    EC^Escherichia coli    Culture - Blood (collected 06 Dec 2018 00:56)  Source: BLOOD PERIPHERAL  Preliminary Report (06 Dec 2018 16:45):    ***Blood Panel PCR results on this specimen are available    approximately 3 hours after the Gram stain result***    Gram stain, PCR, and/or culture results may not always    correspond due to difference in methodologies    ------------------------------------------------------------    This PCR assay was performed using [employer].  The    following targets are tested for:  Enterococcus, vancomycin    resistant enterococci, Listeria monocytogenes,  coagulase    negative staphylococci, S. aureus, methicillin resistant S.    aureus, Streptococcus agalactiae (Group B), S. pneumoniae,    S. pyogenes (Group A), Acinetobacter baumannii, Enterobacter    cloacae, E. coli, Klebsiella oxytoca, K. pneumoniae, Proteus    sp., Serratia marcescens, Haemophilus influenzae, Neisseria    meningitidis, Pseudomonas aeruginosa, Candida albicans, C.    glabrata, C. krusei, C. parapsilosis, C. tropicalis and the    KPC resistance gene.    **NOTE: Due to technical problems, Proteus sp. will NOT be    reported as part of the BCID paneluntil further notice.  Organism: BLOOD CULTURE PCR  Escherichia coli (07 Dec 2018 11:56)  Organism: Escherichia coli (07 Dec 2018 11:56)  Organism: BLOOD CULTURE PCR (06 Dec 2018 16:45)        RADIOLOGY & ADDITIONAL TESTS:    Imaging Personally Reviewed:  Consultant(s) Notes Reviewed:    Care Discussed with Consultants/Other Providers: Patient is a 89y old  Female who presents with a chief complaint of pain and fever (07 Dec 2018 16:57)      SUBJECTIVE / OVERNIGHT EVENTS:  Patient was seen with the daughter at bedside, patient with new non-productive cough this morning,     MEDICATIONS  (STANDING):  ATENolol  Tablet 50 milliGRAM(s) Oral daily  enoxaparin Injectable 30 milliGRAM(s) SubCutaneous every 24 hours  ertapenem  IVPB 1000 milliGRAM(s) IV Intermittent every 24 hours  potassium chloride  10 mEq/100 mL IVPB 10 milliEquivalent(s) IV Intermittent every 1 hour  potassium phosphate IVPB 30 milliMole(s) IV Intermittent once  sodium chloride 0.9%. 1000 milliLiter(s) (50 mL/Hr) IV Continuous <Continuous>    MEDICATIONS  (PRN):      Vital Signs Last 24 Hrs  T(C): 37 (08 Dec 2018 05:32), Max: 37.6 (07 Dec 2018 14:27)  T(F): 98.6 (08 Dec 2018 05:32), Max: 99.7 (07 Dec 2018 14:27)  HR: 102 (08 Dec 2018 05:32) (100 - 116)  BP: 164/88 (08 Dec 2018 05:32) (158/86 - 198/114)  BP(mean): --  RR: 18 (08 Dec 2018 05:32) (18 - 18)  SpO2: 98% (08 Dec 2018 05:32) (98% - 98%)  CAPILLARY BLOOD GLUCOSE        I&O's Summary        PHYSICAL EXAM  GENERAL: Elderly woman laying in bed, appears uncomfortable, cooperative with exam  HEAD:  Atraumatic, Normocephalic  EYES: EOMI, PERRLA, conjunctiva and sclera clear  NECK: Supple, No JVD  CHEST/LUNG: Diffuse wheezing B/L  HEART: Regular rate and rhythm; No murmurs, rubs, or gallops  ABDOMEN: Soft, Nontender, Nondistended; Bowel sounds present  EXTREMITIES:  2+ Peripheral Pulses, No clubbing, cyanosis, or edema  PSYCH: AAOx3  SKIN: No rashes or lesions    LABS:                        10.8   20.82 )-----------( 158      ( 08 Dec 2018 06:42 )             33.0     12-08    130<L>  |  95<L>  |  8   ----------------------------<  86  3.0<L>   |  22  |  0.56    Ca    8.5      08 Dec 2018 06:42  Phos  2.1     12-08  Mg     1.7     12-08                Culture - Blood (collected 07 Dec 2018 06:17)  Source: BLOOD PERIPHERAL  Preliminary Report (08 Dec 2018 06:16):    NO ORGANISMS ISOLATED    NO ORGANISMS ISOLATED AT 24 HOURS    Culture - Blood (collected 07 Dec 2018 06:17)  Source: BLOOD  Preliminary Report (08 Dec 2018 06:16):    NO ORGANISMS ISOLATED    NO ORGANISMS ISOLATED AT 24 HOURS    Culture - Urine (collected 06 Dec 2018 01:21)  Source: URINE CATHETER  Preliminary Report (07 Dec 2018 07:59):    EC^Escherichia coli    COLONY COUNT: > = 100,000 CFU/ML    Culture - Blood (collected 06 Dec 2018 00:56)  Source: BLOOD VENOUS  Preliminary Report (07 Dec 2018 11:52):    EC^Escherichia coli    Culture - Blood (collected 06 Dec 2018 00:56)  Source: BLOOD PERIPHERAL  Preliminary Report (06 Dec 2018 16:45):    ***Blood Panel PCR results on this specimen are available    approximately 3 hours after the Gram stain result***    Gram stain, PCR, and/or culture results may not always    correspond due to difference in methodologies    ------------------------------------------------------------    This PCR assay was performed using [employer].  The    following targets are tested for:  Enterococcus, vancomycin    resistant enterococci, Listeria monocytogenes,  coagulase    negative staphylococci, S. aureus, methicillin resistant S.    aureus, Streptococcus agalactiae (Group B), S. pneumoniae,    S. pyogenes (Group A), Acinetobacter baumannii, Enterobacter    cloacae, E. coli, Klebsiella oxytoca, K. pneumoniae, Proteus    sp., Serratia marcescens, Haemophilus influenzae, Neisseria    meningitidis, Pseudomonas aeruginosa, Candida albicans, C.    glabrata, C. krusei, C. parapsilosis, C. tropicalis and the    KPC resistance gene.    **NOTE: Due to technical problems, Proteus sp. will NOT be    reported as part of the BCID paneluntil further notice.  Organism: BLOOD CULTURE PCR  Escherichia coli (07 Dec 2018 11:56)  Organism: Escherichia coli (07 Dec 2018 11:56)  Organism: BLOOD CULTURE PCR (06 Dec 2018 16:45)        RADIOLOGY & ADDITIONAL TESTS:    Imaging Personally Reviewed:  Consultant(s) Notes Reviewed:    Care Discussed with Consultants/Other Providers: Discussed case with Dr. Regan from ID, Dr. Green from Nephrology Patient is a 89y old  Female who presents with a chief complaint of pain and fever (07 Dec 2018 16:57)      SUBJECTIVE / OVERNIGHT EVENTS:  Patient was seen with the daughter at bedside, patient with new non-productive cough this morning, denies any CP, eating small amounts when fed by family.     MEDICATIONS  (STANDING):  ATENolol  Tablet 50 milliGRAM(s) Oral daily  enoxaparin Injectable 30 milliGRAM(s) SubCutaneous every 24 hours  ertapenem  IVPB 1000 milliGRAM(s) IV Intermittent every 24 hours  potassium chloride  10 mEq/100 mL IVPB 10 milliEquivalent(s) IV Intermittent every 1 hour  potassium phosphate IVPB 30 milliMole(s) IV Intermittent once  sodium chloride 0.9%. 1000 milliLiter(s) (50 mL/Hr) IV Continuous <Continuous>    MEDICATIONS  (PRN):      Vital Signs Last 24 Hrs  T(C): 37 (08 Dec 2018 05:32), Max: 37.6 (07 Dec 2018 14:27)  T(F): 98.6 (08 Dec 2018 05:32), Max: 99.7 (07 Dec 2018 14:27)  HR: 102 (08 Dec 2018 05:32) (100 - 116)  BP: 164/88 (08 Dec 2018 05:32) (158/86 - 198/114)  BP(mean): --  RR: 18 (08 Dec 2018 05:32) (18 - 18)  SpO2: 98% (08 Dec 2018 05:32) (98% - 98%)  CAPILLARY BLOOD GLUCOSE        I&O's Summary        PHYSICAL EXAM  GENERAL: Elderly woman laying in bed, appears uncomfortable, cooperative with exam  HEAD:  Atraumatic, Normocephalic  EYES: EOMI, PERRLA, conjunctiva and sclera clear  NECK: Supple, No JVD  CHEST/LUNG: Diffuse wheezing B/L  HEART: Regular rate and rhythm; No murmurs, rubs, or gallops  ABDOMEN: Soft, Nontender, Nondistended; Bowel sounds present  EXTREMITIES:  2+ Peripheral Pulses, No clubbing, cyanosis, or edema  PSYCH: AAOx3  SKIN: No rashes or lesions    LABS:                        10.8   20.82 )-----------( 158      ( 08 Dec 2018 06:42 )             33.0     12-08    130<L>  |  95<L>  |  8   ----------------------------<  86  3.0<L>   |  22  |  0.56    Ca    8.5      08 Dec 2018 06:42  Phos  2.1     12-08  Mg     1.7     12-08                Culture - Blood (collected 07 Dec 2018 06:17)  Source: BLOOD PERIPHERAL  Preliminary Report (08 Dec 2018 06:16):    NO ORGANISMS ISOLATED    NO ORGANISMS ISOLATED AT 24 HOURS    Culture - Blood (collected 07 Dec 2018 06:17)  Source: BLOOD  Preliminary Report (08 Dec 2018 06:16):    NO ORGANISMS ISOLATED    NO ORGANISMS ISOLATED AT 24 HOURS    Culture - Urine (collected 06 Dec 2018 01:21)  Source: URINE CATHETER  Preliminary Report (07 Dec 2018 07:59):    EC^Escherichia coli    COLONY COUNT: > = 100,000 CFU/ML    Culture - Blood (collected 06 Dec 2018 00:56)  Source: BLOOD VENOUS  Preliminary Report (07 Dec 2018 11:52):    EC^Escherichia coli    Culture - Blood (collected 06 Dec 2018 00:56)  Source: BLOOD PERIPHERAL  Preliminary Report (06 Dec 2018 16:45):    ***Blood Panel PCR results on this specimen are available    approximately 3 hours after the Gram stain result***    Gram stain, PCR, and/or culture results may not always    correspond due to difference in methodologies    ------------------------------------------------------------    This PCR assay was performed using [employer].  The    following targets are tested for:  Enterococcus, vancomycin    resistant enterococci, Listeria monocytogenes,  coagulase    negative staphylococci, S. aureus, methicillin resistant S.    aureus, Streptococcus agalactiae (Group B), S. pneumoniae,    S. pyogenes (Group A), Acinetobacter baumannii, Enterobacter    cloacae, E. coli, Klebsiella oxytoca, K. pneumoniae, Proteus    sp., Serratia marcescens, Haemophilus influenzae, Neisseria    meningitidis, Pseudomonas aeruginosa, Candida albicans, C.    glabrata, C. krusei, C. parapsilosis, C. tropicalis and the    KPC resistance gene.    **NOTE: Due to technical problems, Proteus sp. will NOT be    reported as part of the BCID paneluntil further notice.  Organism: BLOOD CULTURE PCR  Escherichia coli (07 Dec 2018 11:56)  Organism: Escherichia coli (07 Dec 2018 11:56)  Organism: BLOOD CULTURE PCR (06 Dec 2018 16:45)        RADIOLOGY & ADDITIONAL TESTS:    Imaging Personally Reviewed:  Consultant(s) Notes Reviewed:    Care Discussed with Consultants/Other Providers: Discussed case with Dr. Regan from ID, Dr. Green from Nephrology

## 2018-12-09 LAB
BUN SERPL-MCNC: 13 MG/DL — SIGNIFICANT CHANGE UP (ref 7–23)
BUN SERPL-MCNC: 8 MG/DL — SIGNIFICANT CHANGE UP (ref 7–23)
BUN SERPL-MCNC: 9 MG/DL — SIGNIFICANT CHANGE UP (ref 7–23)
CALCIUM SERPL-MCNC: 8.2 MG/DL — LOW (ref 8.4–10.5)
CALCIUM SERPL-MCNC: 8.5 MG/DL — SIGNIFICANT CHANGE UP (ref 8.4–10.5)
CALCIUM SERPL-MCNC: 8.6 MG/DL — SIGNIFICANT CHANGE UP (ref 8.4–10.5)
CHLORIDE SERPL-SCNC: 94 MMOL/L — LOW (ref 98–107)
CHLORIDE SERPL-SCNC: 95 MMOL/L — LOW (ref 98–107)
CHLORIDE SERPL-SCNC: 97 MMOL/L — LOW (ref 98–107)
CO2 SERPL-SCNC: 23 MMOL/L — SIGNIFICANT CHANGE UP (ref 22–31)
CO2 SERPL-SCNC: 24 MMOL/L — SIGNIFICANT CHANGE UP (ref 22–31)
CO2 SERPL-SCNC: 25 MMOL/L — SIGNIFICANT CHANGE UP (ref 22–31)
CREAT SERPL-MCNC: 0.54 MG/DL — SIGNIFICANT CHANGE UP (ref 0.5–1.3)
CREAT SERPL-MCNC: 0.58 MG/DL — SIGNIFICANT CHANGE UP (ref 0.5–1.3)
CREAT SERPL-MCNC: 0.66 MG/DL — SIGNIFICANT CHANGE UP (ref 0.5–1.3)
GLUCOSE SERPL-MCNC: 118 MG/DL — HIGH (ref 70–99)
GLUCOSE SERPL-MCNC: 166 MG/DL — HIGH (ref 70–99)
GLUCOSE SERPL-MCNC: 95 MG/DL — SIGNIFICANT CHANGE UP (ref 70–99)
HCT VFR BLD CALC: 33.9 % — LOW (ref 34.5–45)
HGB BLD-MCNC: 11.1 G/DL — LOW (ref 11.5–15.5)
MAGNESIUM SERPL-MCNC: 1.8 MG/DL — SIGNIFICANT CHANGE UP (ref 1.6–2.6)
MAGNESIUM SERPL-MCNC: 1.8 MG/DL — SIGNIFICANT CHANGE UP (ref 1.6–2.6)
MAGNESIUM SERPL-MCNC: 1.9 MG/DL — SIGNIFICANT CHANGE UP (ref 1.6–2.6)
MCHC RBC-ENTMCNC: 21.5 PG — LOW (ref 27–34)
MCHC RBC-ENTMCNC: 32.7 % — SIGNIFICANT CHANGE UP (ref 32–36)
MCV RBC AUTO: 65.7 FL — LOW (ref 80–100)
NRBC # FLD: 0 — SIGNIFICANT CHANGE UP
PHOSPHATE SERPL-MCNC: 2.3 MG/DL — LOW (ref 2.5–4.5)
PHOSPHATE SERPL-MCNC: 2.8 MG/DL — SIGNIFICANT CHANGE UP (ref 2.5–4.5)
PHOSPHATE SERPL-MCNC: 2.9 MG/DL — SIGNIFICANT CHANGE UP (ref 2.5–4.5)
PLATELET # BLD AUTO: 180 K/UL — SIGNIFICANT CHANGE UP (ref 150–400)
PMV BLD: 10.6 FL — SIGNIFICANT CHANGE UP (ref 7–13)
POTASSIUM SERPL-MCNC: 3.7 MMOL/L — SIGNIFICANT CHANGE UP (ref 3.5–5.3)
POTASSIUM SERPL-MCNC: 4 MMOL/L — SIGNIFICANT CHANGE UP (ref 3.5–5.3)
POTASSIUM SERPL-MCNC: 4.1 MMOL/L — SIGNIFICANT CHANGE UP (ref 3.5–5.3)
POTASSIUM SERPL-SCNC: 3.7 MMOL/L — SIGNIFICANT CHANGE UP (ref 3.5–5.3)
POTASSIUM SERPL-SCNC: 4 MMOL/L — SIGNIFICANT CHANGE UP (ref 3.5–5.3)
POTASSIUM SERPL-SCNC: 4.1 MMOL/L — SIGNIFICANT CHANGE UP (ref 3.5–5.3)
RBC # BLD: 5.16 M/UL — SIGNIFICANT CHANGE UP (ref 3.8–5.2)
RBC # FLD: 14 % — SIGNIFICANT CHANGE UP (ref 10.3–14.5)
SODIUM SERPL-SCNC: 129 MMOL/L — LOW (ref 135–145)
SODIUM SERPL-SCNC: 131 MMOL/L — LOW (ref 135–145)
SODIUM SERPL-SCNC: 131 MMOL/L — LOW (ref 135–145)
WBC # BLD: 11.55 K/UL — HIGH (ref 3.8–10.5)
WBC # FLD AUTO: 11.55 K/UL — HIGH (ref 3.8–10.5)

## 2018-12-09 PROCEDURE — 99232 SBSQ HOSP IP/OBS MODERATE 35: CPT

## 2018-12-09 PROCEDURE — 99233 SBSQ HOSP IP/OBS HIGH 50: CPT

## 2018-12-09 PROCEDURE — 99222 1ST HOSP IP/OBS MODERATE 55: CPT | Mod: GC

## 2018-12-09 RX ORDER — AMLODIPINE BESYLATE 2.5 MG/1
5 TABLET ORAL DAILY
Qty: 0 | Refills: 0 | Status: DISCONTINUED | OUTPATIENT
Start: 2018-12-09 | End: 2018-12-17

## 2018-12-09 RX ORDER — CEFTRIAXONE 500 MG/1
1 INJECTION, POWDER, FOR SOLUTION INTRAMUSCULAR; INTRAVENOUS EVERY 24 HOURS
Qty: 0 | Refills: 0 | Status: DISCONTINUED | OUTPATIENT
Start: 2018-12-09 | End: 2018-12-13

## 2018-12-09 RX ORDER — POTASSIUM PHOSPHATE, MONOBASIC POTASSIUM PHOSPHATE, DIBASIC 236; 224 MG/ML; MG/ML
15 INJECTION, SOLUTION INTRAVENOUS ONCE
Qty: 0 | Refills: 0 | Status: COMPLETED | OUTPATIENT
Start: 2018-12-09 | End: 2018-12-09

## 2018-12-09 RX ORDER — AMLODIPINE BESYLATE 2.5 MG/1
5 TABLET ORAL DAILY
Qty: 0 | Refills: 0 | Status: DISCONTINUED | OUTPATIENT
Start: 2018-12-09 | End: 2018-12-09

## 2018-12-09 RX ADMIN — ENOXAPARIN SODIUM 30 MILLIGRAM(S): 100 INJECTION SUBCUTANEOUS at 19:10

## 2018-12-09 RX ADMIN — POTASSIUM PHOSPHATE, MONOBASIC POTASSIUM PHOSPHATE, DIBASIC 62.5 MILLIMOLE(S): 236; 224 INJECTION, SOLUTION INTRAVENOUS at 15:58

## 2018-12-09 RX ADMIN — AMLODIPINE BESYLATE 5 MILLIGRAM(S): 2.5 TABLET ORAL at 12:37

## 2018-12-09 RX ADMIN — CEFTRIAXONE 100 GRAM(S): 500 INJECTION, POWDER, FOR SOLUTION INTRAMUSCULAR; INTRAVENOUS at 12:37

## 2018-12-09 RX ADMIN — Medication 3 MILLILITER(S): at 10:16

## 2018-12-09 RX ADMIN — ATENOLOL 50 MILLIGRAM(S): 25 TABLET ORAL at 05:18

## 2018-12-09 NOTE — PROGRESS NOTE ADULT - SUBJECTIVE AND OBJECTIVE BOX
CC: Patient is a 89y old  Female who presents with a chief complaint of pain and fever (08 Dec 2018 19:58)    ID following for bacteremia    Interval History/ROS: Patient has no new complaints. Denies fever, chills. Leukocytosis improving.    Rest of ROS negative.    Allergies  No Known Allergies    ANTIMICROBIALS:  ertapenem  IVPB 1000 every 24 hours    PE:    Vital Signs Last 24 Hrs  T(C): 37 (09 Dec 2018 05:16), Max: 37.4 (08 Dec 2018 21:11)  T(F): 98.6 (09 Dec 2018 05:16), Max: 99.4 (08 Dec 2018 21:11)  HR: 67 (09 Dec 2018 10:17) (67 - 95)  BP: 160/74 (09 Dec 2018 05:16) (130/80 - 183/86)  BP(mean): --  RR: 18 (09 Dec 2018 05:16) (18 - 18)  SpO2: 99% (09 Dec 2018 05:16) (99% - 99%)    Gen: Awake, alert, NAD, non-toxic  CV: S1+S2 normal, no murmurs  Resp: Expiratory wheezing  Abd: Soft, nontender, +BS  Ext: No LE edema, no wounds  : No Young  IV/Skin: No thrombophlebitis  Neuro: no focal deficits    LABS:                          11.1   11.55 )-----------( 180      ( 09 Dec 2018 06:26 )             33.9       12-09    131<L>  |  97<L>  |  8   ----------------------------<  95  4.0   |  24  |  0.54    Ca    8.2<L>      09 Dec 2018 06:26  Phos  2.8     12-09  Mg     1.9     12-09    MICROBIOLOGY:  v  BLOOD  12-07-18 --  --  --      URINE CATHETER  12-06-18 --  --  Escherichia coli      BLOOD PERIPHERAL  12-06-18 --  --  BLOOD CULTURE PCR  Escherichia coli    RADIOLOGY:    < from: Xray Chest 1 View- PORTABLE-Urgent (12.08.18 @ 12:12) >  IMPRESSION:  No acute pulmonary disease.    < end of copied text >

## 2018-12-09 NOTE — PROGRESS NOTE ADULT - PROBLEM SELECTOR PLAN 1
Sepsis 2/2 E. Coli bacteremia likely from UTI  Blood and urine cx sensitivities inconsistent, both E. coli; repeat bcx 12/7 NTD  Switch to IV ceftriaxone 1 gm q24  F/U CT abdo/pelvis with po/IV contrast to r/o GI source  ID following, recommendations appreciated

## 2018-12-09 NOTE — CONSULT NOTE ADULT - SUBJECTIVE AND OBJECTIVE BOX
CHIEF COMPLAINT: weakness and fever    HPI:  89F hx HTN, who p/w weakness and fever since Monday. Brought in by her family.     Initially found to be hypoxic to 91% on RA, increased with supplemental O2.     Pt admitted for sepsis 2/2 E. coli bacteremia, likely from UTI. Given ceftriaxone and now with negative blood cultures x1. Also initially presented with NEREIDA and hyponatremia, which had some initial improvement with IVF.     Pulmonary consulted for wheezing.    PAST MEDICAL & SURGICAL HISTORY:  No significant past surgical history      FAMILY HISTORY:      SOCIAL HISTORY:  Smoking:  Substance Use:   EtOH Use:  Marital Status: [ ] Single [ ]  [ ]  [ ]   Sexual History:   Occupation:  Recent Travel:  Country of Birth:  Advance Directives:    Allergies  No Known Allergies    HOME MEDICATIONS:    REVIEW OF SYSTEMS:  Constitutional: [ ] negative [ ] fevers [ ] chills [ ] weight loss [ ] weight gain  HEENT: [ ] negative [ ] dry eyes [ ] eye irritation [ ] postnasal drip [ ] nasal congestion  CV: [ ] negative  [ ] chest pain [ ] orthopnea [ ] palpitations [ ] murmur  Resp: [ ] negative [ ] cough [ ] shortness of breath [ ] dyspnea [ ] wheezing [ ] sputum [ ] hemoptysis  GI: [ ] negative [ ] nausea [ ] vomiting [ ] diarrhea [ ] constipation [ ] abd pain [ ] dysphagia   : [ ] negative [ ] dysuria [ ] nocturia [ ] hematuria [ ] increased urinary frequency  Musculoskeletal: [ ] negative [ ] back pain [ ] myalgias [ ] arthralgias [ ] fracture  Skin: [ ] negative [ ] rash [ ] itch  Neurological: [ ] negative [ ] headache [ ] dizziness [ ] syncope [ ] weakness [ ] numbness  Psychiatric: [ ] negative [ ] anxiety [ ] depression  Endocrine: [ ] negative [ ] diabetes [ ] thyroid problem  Hematologic/Lymphatic: [ ] negative [ ] anemia [ ] bleeding problem  Allergic/Immunologic: [ ] negative [ ] itchy eyes [ ] nasal discharge [ ] hives [ ] angioedema  [ ] All other systems negative  [ ] Unable to assess ROS because ________    OBJECTIVE:  ICU Vital Signs Last 24 Hrs  T(C): 37.1 (09 Dec 2018 12:29), Max: 37.4 (08 Dec 2018 21:11)  T(F): 98.7 (09 Dec 2018 12:29), Max: 99.4 (08 Dec 2018 21:11)  HR: 81 (09 Dec 2018 12:29) (67 - 95)  BP: 170/90 (09 Dec 2018 12:29) (130/80 - 183/86)  BP(mean): --  ABP: --  ABP(mean): --  RR: 18 (09 Dec 2018 12:29) (18 - 18)  SpO2: 100% (09 Dec 2018 12:29) (99% - 100%)      PHYSICAL EXAM:  General:   HEENT:   Respiratory:   Cardiovascular:   Abdomen:   Extremities:   Skin:   Neurological:    HOSPITAL MEDICATIONS:  enoxaparin Injectable 30 milliGRAM(s) SubCutaneous every 24 hours    cefTRIAXone   IVPB 1 Gram(s) IV Intermittent every 24 hours    amLODIPine   Tablet 5 milliGRAM(s) Oral daily    ALBUTerol/ipratropium for Nebulization 3 milliLiter(s) Nebulizer every 4 hours PRN      LABS:                        11.1   11.55 )-----------( 180      ( 09 Dec 2018 06:26 )             33.9     Hgb Trend: 11.1<--, 10.8<--, 11.6<--, 11.9<--  12-09    131<L>  |  97<L>  |  8   ----------------------------<  95  4.0   |  24  |  0.54    Ca    8.2<L>      09 Dec 2018 06:26  Phos  2.8     12-09  Mg     1.9     12-09    Creatinine Trend: 0.54<--, 0.56<--, 0.67<--, 1.34<--    MICROBIOLOGY:   RVP neg x2  BCx 12/6 E. coli  BCx 12/7 NGTD    RADIOLOGY:  CXR 12/8: no focal opacities, blunting of right costophrenic angle    TTE:   DIMENSIONS:  Dimensions:     Normal Values:  LA:     3.8 cm    2.0 - 4.0 cm  Ao:     3.0 cm    2.0 - 3.8 cm  SEPTUM: 0.9 cm    0.6 - 1.2 cm  PWT:    0.9 cm    0.6 - 1.1 cm  LVIDd:  3.8 cm    3.0 - 5.6 cm  LVIDs:    ---     1.8 - 4.0 cm  Derived Variables:  LVMI: 75 g/m2  RWT: 0.47  ------------------------------------------------------------------------  OBSERVATIONS:  Mitral Valve: Mitral annular calcification, otherwise  normal mitral valve.  Aortic Root: Normal aortic root.  Aortic Valve: Calcified trileaflet aortic valve with normal  opening.  Left Atrium: Normal left atrium.  Left Ventricle: Normal left ventricular systolic function.  No segmental wall motion abnormalities. Increased relative  wall thickness with normal left ventricular mass index,  consistent with concentric left ventricular remodeling.  Right Heart: Normal right atrium. Normal right ventricular  size and function. Normal tricuspid valve. Normal pulmonic  valve.  Pericardium/PleuraNormal pericardium with no pericardial  effusion.  ------------------------------------------------------------------------  CONCLUSIONS:  1. Calcified trileaflet aortic valve with normal opening.  2. Increased relative wall thickness with normal left  ventricular mass index, consistent with concentric left  ventricular remodeling.  3. Normal left ventricular systolic function. No segmental  wall motion abnormalities.  4. Normal right ventricular size and function. CHIEF COMPLAINT: weakness and fever    HPI:  89F hx HTN, who p/w weakness and fever since Monday. Initially she was feeling normal weekend prior. On Monday, she started to have a low grade fever and myalgia. The next day she was generally feeling better and around her baseline but again started to feel much weaker and noted to have fatigue as per family. Finally on Thursday, she had shaking, fevers and a ?panting breathing pattern as per daughter. Family brought her to the ED. Prior to this pt denied chills, chest pain, SOB, abdominal pain, nausea/vomiting, diarrhea, b/l LE edema, dysuria, sore throat, rhinorrhea, nasal congest. Had some cough that was initially productive but now dry. No sick contacts. Lives at home with  and children, who take care of her. Walks with walker but slowly.     When the pt arrived to the ED, she was initially found to be hypoxic to 91% on RA, which corrected with supplemental O2. She was admitted to the medical floors for sepsis 2/2 E. coli bacteremia, likely from from UTI. Given ceftriaxone and now with negative blood cultures x1. Also initially presented with NEREIDA and hyponatremia, which had some initial improvement with IVF. During her hospitalization, she was found to be diffusely wheezing. Started on duonebs. TTE done, which showed normal systolic function but did not comment on diastolic dysfunction. CXR unchanged from prior. CT Chest ordered.     Pulmonary consulted for wheezing. Pt has no history of lung disease. She does not smoke. Wheezing occurs mostly when the patient moves. Daughter notes that duonebs are helpful.    PAST MEDICAL & SURGICAL HISTORY:  No significant past surgical history    FAMILY HISTORY: Non-contributory    SOCIAL HISTORY:  Smoking: none  Substance Use: none  EtOH Use: none  Marital Status: [ ] Single [x]  [ ]  [ ]   Lives with children, no HHA    Allergies  No Known Allergies    HOME MEDICATIONS:  BP med (unknown name)  Calcitonin    REVIEW OF SYSTEMS:  Constitutional: [ ] negative [x] fevers [ ] chills [ ] weight loss [ ] weight gain  HEENT: [x] negative [ ] dry eyes [ ] eye irritation [ ] postnasal drip [ ] nasal congestion  CV: [x] negative  [ ] chest pain [ ] orthopnea [ ] palpitations [ ] murmur  Resp: [ ] negative [x] cough [ ] shortness of breath [ ] dyspnea [x] wheezing [-] sputum [ ] hemoptysis  GI: [x] negative [ ] nausea [ ] vomiting [ ] diarrhea [ ] constipation [ ] abd pain [ ] dysphagia   : [x] negative [ ] dysuria [ ] nocturia [ ] hematuria [ ] increased urinary frequency  Musculoskeletal: [ ] negative [ ] back pain [x] myalgias [ ] arthralgias [ ] fracture  Skin: [x] negative [ ] rash [ ] itch  Neurological: [x] negative [ ] headache [ ] dizziness [ ] syncope [ ] weakness [ ] numbness  Psychiatric: [x] negative [ ] anxiety [ ] depression  Endocrine: [x] negative [ ] diabetes [ ] thyroid problem  Hematologic/Lymphatic: [x] negative [ ] anemia [ ] bleeding problem  Allergic/Immunologic: [x] negative [ ] itchy eyes [ ] nasal discharge [ ] hives [ ] angioedema    OBJECTIVE:  ICU Vital Signs Last 24 Hrs  T(C): 37.1 (09 Dec 2018 12:29), Max: 37.4 (08 Dec 2018 21:11)  T(F): 98.7 (09 Dec 2018 12:29), Max: 99.4 (08 Dec 2018 21:11)  HR: 81 (09 Dec 2018 12:29) (67 - 95)  BP: 170/90 (09 Dec 2018 12:29) (130/80 - 183/86)  BP(mean): --  ABP: --  ABP(mean): --  RR: 18 (09 Dec 2018 12:29) (18 - 18)  SpO2: 100% (09 Dec 2018 12:29) (99% - 100%)      PHYSICAL EXAM:  General: NAD, somnolent but easily arousable  HEENT: NCAT, moist mucosal membranes  Respiratory: very mild exp wheezing in mid-lung field on right; otherwise clear; no upper airway wheezing; some abdominal muscle use with breathing but no nasal flaring  Cardiovascular: S1/S2 normal, RRR, no murmurs/rubs/gallops appreciated  Abdomen: soft, non-tender, non-distended with normal bowel sounds  Extremities: no b/l LE edema  Skin: warm/dry  Neurological: somnolent but easily arousable    HOSPITAL MEDICATIONS:  enoxaparin Injectable 30 milliGRAM(s) SubCutaneous every 24 hours    cefTRIAXone   IVPB 1 Gram(s) IV Intermittent every 24 hours    amLODIPine   Tablet 5 milliGRAM(s) Oral daily    ALBUTerol/ipratropium for Nebulization 3 milliLiter(s) Nebulizer every 4 hours PRN      LABS:                        11.1   11.55 )-----------( 180      ( 09 Dec 2018 06:26 )             33.9     Hgb Trend: 11.1<--, 10.8<--, 11.6<--, 11.9<--  12-09    131<L>  |  97<L>  |  8   ----------------------------<  95  4.0   |  24  |  0.54    Ca    8.2<L>      09 Dec 2018 06:26  Phos  2.8     12-09  Mg     1.9     12-09    Creatinine Trend: 0.54<--, 0.56<--, 0.67<--, 1.34<--    MICROBIOLOGY:   RVP neg x2  BCx 12/6 E. coli  BCx 12/7 NGTD    RADIOLOGY:  CXR 12/8: no focal opacities, blunting of right costophrenic angle    TTE:   DIMENSIONS:  Dimensions:     Normal Values:  LA:     3.8 cm    2.0 - 4.0 cm  Ao:     3.0 cm    2.0 - 3.8 cm  SEPTUM: 0.9 cm    0.6 - 1.2 cm  PWT:    0.9 cm    0.6 - 1.1 cm  LVIDd:  3.8 cm    3.0 - 5.6 cm  LVIDs:    ---     1.8 - 4.0 cm  Derived Variables:  LVMI: 75 g/m2  RWT: 0.47  ------------------------------------------------------------------------  OBSERVATIONS:  Mitral Valve: Mitral annular calcification, otherwise  normal mitral valve.  Aortic Root: Normal aortic root.  Aortic Valve: Calcified trileaflet aortic valve with normal  opening.  Left Atrium: Normal left atrium.  Left Ventricle: Normal left ventricular systolic function.  No segmental wall motion abnormalities. Increased relative  wall thickness with normal left ventricular mass index,  consistent with concentric left ventricular remodeling.  Right Heart: Normal right atrium. Normal right ventricular  size and function. Normal tricuspid valve. Normal pulmonic  valve.  Pericardium/PleuraNormal pericardium with no pericardial  effusion.  ------------------------------------------------------------------------  CONCLUSIONS:  1. Calcified trileaflet aortic valve with normal opening.  2. Increased relative wall thickness with normal left  ventricular mass index, consistent with concentric left  ventricular remodeling.  3. Normal left ventricular systolic function. No segmental  wall motion abnormalities.  4. Normal right ventricular size and function.    Bedside US: predominantly A lines, some scattered B lines in posterior bases; left sided pleural effusion (very small) with alveolar consolidation pattern likely atelectasis

## 2018-12-09 NOTE — PROGRESS NOTE ADULT - SUBJECTIVE AND OBJECTIVE BOX
Patient is a 89y old  Female who presents with a chief complaint of pain and fever (09 Dec 2018 10:22)    SUBJECTIVE / OVERNIGHT EVENTS:  Patient seen with brother and Dr. Matson (nephew) at bedside. Patient continues to cough, denying any CP/dyspnea. Did not eat dinner, minimal breakfast.     MEDICATIONS  (STANDING):  ATENolol  Tablet 50 milliGRAM(s) Oral daily  enoxaparin Injectable 30 milliGRAM(s) SubCutaneous every 24 hours  ertapenem  IVPB 1000 milliGRAM(s) IV Intermittent every 24 hours    MEDICATIONS  (PRN):  ALBUTerol/ipratropium for Nebulization 3 milliLiter(s) Nebulizer every 4 hours PRN Shortness of Breath and/or Wheezing      Vital Signs Last 24 Hrs  T(C): 37 (09 Dec 2018 05:16), Max: 37.4 (08 Dec 2018 21:11)  T(F): 98.6 (09 Dec 2018 05:16), Max: 99.4 (08 Dec 2018 21:11)  HR: 67 (09 Dec 2018 10:17) (67 - 95)  BP: 160/74 (09 Dec 2018 05:16) (130/80 - 183/86)  BP(mean): --  RR: 18 (09 Dec 2018 05:16) (18 - 18)  SpO2: 99% (09 Dec 2018 05:16) (99% - 99%)  CAPILLARY BLOOD GLUCOSE        I&O's Summary      PHYSICAL EXAM  GENERAL: Elderly woman laying in bed, appears uncomfortable, cooperative with exam  HEAD:  Atraumatic, Normocephalic  EYES: EOMI, PERRLA, conjunctiva and sclera clear  NECK: Supple, No JVD  CHEST/LUNG: Diffuse wheezing B/L  HEART: Regular rate and rhythm; No murmurs, rubs, or gallops  ABDOMEN: Soft, Nontender, Nondistended; Bowel sounds present  EXTREMITIES:  2+ Peripheral Pulses, No clubbing, cyanosis, or edema  PSYCH: AAOx3  SKIN: No rashes or lesions    LABS:                        11.1   11.55 )-----------( 180      ( 09 Dec 2018 06:26 )             33.9     12-09    131<L>  |  97<L>  |  8   ----------------------------<  95  4.0   |  24  |  0.54    Ca    8.2<L>      09 Dec 2018 06:26  Phos  2.8     12-09  Mg     1.9     12-09                Culture - Blood (collected 07 Dec 2018 06:17)  Source: BLOOD PERIPHERAL  Preliminary Report (09 Dec 2018 06:16):    NO ORGANISMS ISOLATED    NO ORGANISMS ISOLATED AT 48 HRS.    Culture - Blood (collected 07 Dec 2018 06:17)  Source: BLOOD  Preliminary Report (09 Dec 2018 06:16):    NO ORGANISMS ISOLATED    NO ORGANISMS ISOLATED AT 48 HRS.        RADIOLOGY & ADDITIONAL TESTS:    Imaging Personally Reviewed:  Consultant(s) Notes Reviewed:    Care Discussed with Consultants/Other Providers: Discussed case with Dr. Regan from ID

## 2018-12-09 NOTE — PROGRESS NOTE ADULT - PROBLEM SELECTOR PLAN 4
Likely pre-renal, improved with IVF  Monitor BMP, avoid nephrotoxic medications, renally dose medications

## 2018-12-09 NOTE — CONSULT NOTE ADULT - ASSESSMENT
----------------------------------------  Darcy Hagen MD PGY-5  Pulmonary/Critical Care Fellow  Pager # [phone] ([facility]), [phone] ([facility]) 89F hx HTN, who p/w weakness and fever since Monday. Currently being treated for E. coli bacteremia. Found to have diffuse wheezing during hospitalization. Pulm c/s'ed for wheezing.     #Wheezing  Pt has no history of lung disease. She does not smoke. Has never had wheezing before. During hospitalization found to have diffuse b/l wheezing, now resolving with duonebs but worse when pt moves. Saturating well on RA. CXR without clear etiology. Bedside US only significant for very small pleural effusion with alveolar consolidation pattern, likely atelectasis. No evidence of fluid overload on exam or on bedside US. Wheezing is most likely from bronchospasm.  - OOB to chair  - incentive spirometry  - cont duonebs  - no need for steroids  - outpatient PFTs  - will await CT chest results    Case d/w Dr. Freeman (pulm attending)    ----------------------------------------  Darcy Hagen MD PGY-5  Pulmonary/Critical Care Fellow  Pager # [phone] ([facility]), [phone] ([facility])

## 2018-12-09 NOTE — PROGRESS NOTE ADULT - PROBLEM SELECTOR PLAN 2
Diffuse wheezing on exam, no hx of pulmonary disease, TTE with normal LVSF, no documented diastolic dysfunction, infection less likely as patient remains afebrile with improvement in leukocytosis, CXR clear, RVP neg x2; possible reactive airway disease and/or atelectasis   F/U CT chest with IV contrast  Conservative management for now, nebs q4  OOB to chair when possible  Pulmonary consult, recommendations appreciated

## 2018-12-10 LAB
BUN SERPL-MCNC: 12 MG/DL — SIGNIFICANT CHANGE UP (ref 7–23)
BUN SERPL-MCNC: 14 MG/DL — SIGNIFICANT CHANGE UP (ref 7–23)
CALCIUM SERPL-MCNC: 8.9 MG/DL — SIGNIFICANT CHANGE UP (ref 8.4–10.5)
CALCIUM SERPL-MCNC: 8.9 MG/DL — SIGNIFICANT CHANGE UP (ref 8.4–10.5)
CHLORIDE SERPL-SCNC: 96 MMOL/L — LOW (ref 98–107)
CHLORIDE SERPL-SCNC: 98 MMOL/L — SIGNIFICANT CHANGE UP (ref 98–107)
CO2 SERPL-SCNC: 24 MMOL/L — SIGNIFICANT CHANGE UP (ref 22–31)
CO2 SERPL-SCNC: 25 MMOL/L — SIGNIFICANT CHANGE UP (ref 22–31)
CREAT SERPL-MCNC: 0.53 MG/DL — SIGNIFICANT CHANGE UP (ref 0.5–1.3)
CREAT SERPL-MCNC: 0.56 MG/DL — SIGNIFICANT CHANGE UP (ref 0.5–1.3)
GLUCOSE SERPL-MCNC: 109 MG/DL — HIGH (ref 70–99)
GLUCOSE SERPL-MCNC: 98 MG/DL — SIGNIFICANT CHANGE UP (ref 70–99)
HCT VFR BLD CALC: 33.4 % — LOW (ref 34.5–45)
HGB BLD-MCNC: 11.3 G/DL — LOW (ref 11.5–15.5)
MAGNESIUM SERPL-MCNC: 1.8 MG/DL — SIGNIFICANT CHANGE UP (ref 1.6–2.6)
MCHC RBC-ENTMCNC: 22.1 PG — LOW (ref 27–34)
MCHC RBC-ENTMCNC: 33.8 % — SIGNIFICANT CHANGE UP (ref 32–36)
MCV RBC AUTO: 65.4 FL — LOW (ref 80–100)
NRBC # FLD: 0 — SIGNIFICANT CHANGE UP
PHOSPHATE SERPL-MCNC: 3 MG/DL — SIGNIFICANT CHANGE UP (ref 2.5–4.5)
PLATELET # BLD AUTO: 218 K/UL — SIGNIFICANT CHANGE UP (ref 150–400)
PMV BLD: 10.5 FL — SIGNIFICANT CHANGE UP (ref 7–13)
POTASSIUM SERPL-MCNC: 3.9 MMOL/L — SIGNIFICANT CHANGE UP (ref 3.5–5.3)
POTASSIUM SERPL-MCNC: 4 MMOL/L — SIGNIFICANT CHANGE UP (ref 3.5–5.3)
POTASSIUM SERPL-SCNC: 3.9 MMOL/L — SIGNIFICANT CHANGE UP (ref 3.5–5.3)
POTASSIUM SERPL-SCNC: 4 MMOL/L — SIGNIFICANT CHANGE UP (ref 3.5–5.3)
RBC # BLD: 5.11 M/UL — SIGNIFICANT CHANGE UP (ref 3.8–5.2)
RBC # FLD: 14.2 % — SIGNIFICANT CHANGE UP (ref 10.3–14.5)
SODIUM SERPL-SCNC: 131 MMOL/L — LOW (ref 135–145)
SODIUM SERPL-SCNC: 133 MMOL/L — LOW (ref 135–145)
WBC # BLD: 11.2 K/UL — HIGH (ref 3.8–10.5)
WBC # FLD AUTO: 11.2 K/UL — HIGH (ref 3.8–10.5)

## 2018-12-10 PROCEDURE — 99232 SBSQ HOSP IP/OBS MODERATE 35: CPT | Mod: GC

## 2018-12-10 PROCEDURE — 99232 SBSQ HOSP IP/OBS MODERATE 35: CPT

## 2018-12-10 PROCEDURE — 74177 CT ABD & PELVIS W/CONTRAST: CPT | Mod: 26

## 2018-12-10 PROCEDURE — 71260 CT THORAX DX C+: CPT | Mod: 26

## 2018-12-10 PROCEDURE — 99233 SBSQ HOSP IP/OBS HIGH 50: CPT

## 2018-12-10 RX ORDER — IPRATROPIUM/ALBUTEROL SULFATE 18-103MCG
3 AEROSOL WITH ADAPTER (GRAM) INHALATION EVERY 4 HOURS
Qty: 0 | Refills: 0 | Status: DISCONTINUED | OUTPATIENT
Start: 2018-12-10 | End: 2018-12-12

## 2018-12-10 RX ORDER — ALBUTEROL 90 UG/1
1 AEROSOL, METERED ORAL EVERY 4 HOURS
Qty: 0 | Refills: 0 | Status: DISCONTINUED | OUTPATIENT
Start: 2018-12-10 | End: 2018-12-10

## 2018-12-10 RX ORDER — TIOTROPIUM BROMIDE 18 UG/1
1 CAPSULE ORAL; RESPIRATORY (INHALATION) DAILY
Qty: 0 | Refills: 0 | Status: DISCONTINUED | OUTPATIENT
Start: 2018-12-10 | End: 2018-12-16

## 2018-12-10 RX ADMIN — AMLODIPINE BESYLATE 5 MILLIGRAM(S): 2.5 TABLET ORAL at 05:10

## 2018-12-10 RX ADMIN — Medication 3 MILLILITER(S): at 17:14

## 2018-12-10 RX ADMIN — Medication 3 MILLILITER(S): at 13:09

## 2018-12-10 RX ADMIN — CEFTRIAXONE 100 GRAM(S): 500 INJECTION, POWDER, FOR SOLUTION INTRAMUSCULAR; INTRAVENOUS at 12:26

## 2018-12-10 RX ADMIN — ENOXAPARIN SODIUM 30 MILLIGRAM(S): 100 INJECTION SUBCUTANEOUS at 19:04

## 2018-12-10 RX ADMIN — Medication 3 MILLILITER(S): at 21:23

## 2018-12-10 NOTE — PROGRESS NOTE ADULT - PROBLEM SELECTOR PLAN 5
Hyponatremia, initial improvement with IVF, suspected due to poor nutritional intake; urine lytes obtained during IVF administration  IVF DC, c/w fluid restriction, add ensure to diet  Nephrology consult, recommendations appreciated  Hypokalemia; resolved   Nephrology recommendations appreciated

## 2018-12-10 NOTE — PROGRESS NOTE ADULT - PROBLEM SELECTOR PLAN 2
Pt. with acute hyponatremia initially due to poor PO intake. Pt. found to have low serum sodium of 124 on admission (12/6/18) which subsequently improved to 130 on 12/7/18 after IVFs. Latest serum sodium has improved to 133 today. C/w fluid restriction. Monitor BMP daily.

## 2018-12-10 NOTE — PROGRESS NOTE ADULT - SUBJECTIVE AND OBJECTIVE BOX
CHIEF COMPLAINT: wheezing    Interval Events:  Still with some wheezing overnight.  No respiratory distress per daughter at bedside.  No fevers/chills.  No cp.  Cough but cannot bring up anything.    REVIEW OF SYSTEMS:  Constitutional: [ ] negative [ ] fevers [ ] chills [ ] weight loss [ ] weight gain  HEENT: [ ] negative [ ] dry eyes [ ] eye irritation [ ] postnasal drip [ ] nasal congestion  CV: [ ] negative  [ ] chest pain [ ] orthopnea [ ] palpitations [ ] murmur  Resp: [ ] negative [ ] cough [ ] shortness of breath [ ] dyspnea [ ] wheezing [ ] sputum [ ] hemoptysis  GI: [ ] negative [ ] nausea [ ] vomiting [ ] diarrhea [ ] constipation [ ] abd pain [ ] dysphagia   : [ ] negative [ ] dysuria [ ] nocturia [ ] hematuria [ ] increased urinary frequency  Musculoskeletal: [ ] negative [ ] back pain [ ] myalgias [ ] arthralgias [ ] fracture  Skin: [ ] negative [ ] rash [ ] itch  Neurological: [ ] negative [ ] headache [ ] dizziness [ ] syncope [ ] weakness [ ] numbness  Psychiatric: [ ] negative [ ] anxiety [ ] depression  Endocrine: [ ] negative [ ] diabetes [ ] thyroid problem  Hematologic/Lymphatic: [ ] negative [ ] anemia [ ] bleeding problem  Allergic/Immunologic: [ ] negative [ ] itchy eyes [ ] nasal discharge [ ] hives [ ] angioedema  [x ] All other systems negative except as above  [ ] Unable to assess ROS because ________    OBJECTIVE:  ICU Vital Signs Last 24 Hrs  T(C): 36.6 (10 Dec 2018 05:07), Max: 37.1 (09 Dec 2018 12:29)  T(F): 97.9 (10 Dec 2018 05:07), Max: 98.7 (09 Dec 2018 12:29)  HR: 89 (10 Dec 2018 05:07) (81 - 100)  BP: 148/78 (10 Dec 2018 05:07) (148/78 - 170/90)  BP(mean): --  ABP: --  ABP(mean): --  RR: 18 (10 Dec 2018 05:07) (18 - 18)  SpO2: 99% (10 Dec 2018 05:07) (99% - 100%)        12-09 @ 07:01  -  12-10 @ 07:00  --------------------------------------------------------  IN: 0 mL / OUT: 2 mL / NET: -2 mL      CAPILLARY BLOOD GLUCOSE          PHYSICAL EXAM:  General: NAD, arousable  HEENT: NCAT, moist mucosal membranes  Respiratory: faint exp wheezing apices, rhonchi at bases b/l  Cardiovascular: S1/S2 normal, RRR, no murmurs/rubs/gallops appreciated  Abdomen: soft, non-tender, non-distended with normal bowel sounds  Extremities: no b/l LE edema  Skin: warm/dry  Neurological: somnolent but easily arousable      HOSPITAL MEDICATIONS:  MEDICATIONS  (STANDING):  amLODIPine   Tablet 5 milliGRAM(s) Oral daily  cefTRIAXone   IVPB 1 Gram(s) IV Intermittent every 24 hours  enoxaparin Injectable 30 milliGRAM(s) SubCutaneous every 24 hours    MEDICATIONS  (PRN):  ALBUTerol/ipratropium for Nebulization 3 milliLiter(s) Nebulizer every 4 hours PRN Shortness of Breath and/or Wheezing      LABS:                        11.3   11.20 )-----------( 218      ( 10 Dec 2018 06:25 )             33.4     Hgb Trend: 11.3<--, 11.1<--, 10.8<--, 11.6<--, 11.9<--  12-10    133<L>  |  98  |  12  ----------------------------<  98  4.0   |  24  |  0.53    Ca    8.9      10 Dec 2018 06:25  Phos  3.0     12-10  Mg     1.8     12-10      Creatinine Trend: 0.53<--, 0.56<--, 0.58<--, 0.66<--, 0.54<--, 0.56<--            MICROBIOLOGY:       RADIOLOGY:  [ ] Reviewed and interpreted by me    PULMONARY FUNCTION TESTS:    EKG:

## 2018-12-10 NOTE — PROGRESS NOTE ADULT - PROBLEM SELECTOR PLAN 1
Pt. with likely hemodynamically mediated NEREIDA in the setting of poor PO intake and infection which has resolved. Pt. with elevated Scr of 1.34 on admission (12/6/18) with subsequent improvement in Scr. Latest Scr is WNL(0.53) today. Monitor BMP daily. Will sign off for now. Please recall as needed.

## 2018-12-10 NOTE — PROGRESS NOTE ADULT - ATTENDING COMMENTS
89 year old woman with E. coli UTI and bacteremia, found to have diffuse wheezing during hospitalization.     still has wheezing on exam  continue nebulizers please change to ATC  OOB to chair and chest PT    will follow

## 2018-12-10 NOTE — PROGRESS NOTE ADULT - ATTENDING COMMENTS
Jake Montoya  Attending Physician   Division of Infectious Disease  Pager #[phone]  After 5pm/weekend or no response, call #[phone]

## 2018-12-10 NOTE — PROGRESS NOTE ADULT - SUBJECTIVE AND OBJECTIVE BOX
Patient is a 89y old  Female who presents with a chief complaint of pain and fever (10 Dec 2018 11:24)      SUBJECTIVE / OVERNIGHT EVENTS: patient seen and examined by bedside, family at bedside, denies pain, respiratory distress , CP, abdominal pain, eating well  Family at bedside translating       MEDICATIONS  (STANDING):  ALBUTerol/ipratropium for Nebulization 3 milliLiter(s) Nebulizer every 4 hours  amLODIPine   Tablet 5 milliGRAM(s) Oral daily  cefTRIAXone   IVPB 1 Gram(s) IV Intermittent every 24 hours  enoxaparin Injectable 30 milliGRAM(s) SubCutaneous every 24 hours  tiotropium 18 MICROgram(s) Capsule 1 Capsule(s) Inhalation daily    MEDICATIONS  (PRN):      Vital Signs Last 24 Hrs  T(C): 36.6 (10 Dec 2018 05:07), Max: 36.8 (09 Dec 2018 20:10)  T(F): 97.9 (10 Dec 2018 05:07), Max: 98.3 (09 Dec 2018 20:10)  HR: 100 (10 Dec 2018 13:09) (89 - 104)  BP: 148/78 (10 Dec 2018 05:07) (148/78 - 153/69)  BP(mean): --  RR: 18 (10 Dec 2018 05:07) (18 - 18)  SpO2: 99% (10 Dec 2018 05:07) (99% - 100%)  CAPILLARY BLOOD GLUCOSE        I&O's Summary    09 Dec 2018 07:01  -  10 Dec 2018 07:00  --------------------------------------------------------  IN: 0 mL / OUT: 2 mL / NET: -2 mL      PHYSICAL EXAM  GENERAL: Elderly woman laying in bed, appears comfortable,   HEAD:  Atraumatic, Normocephalic  EYES: EOMI, PERRLA, conjunctiva and sclera clear  NECK: Supple,   CHEST/LUNG: Diffuse wheezing B/L  HEART: Regular rate and rhythm;  ABDOMEN: Soft, Nontender, Nondistended; Bowel sounds present  EXTREMITIES:  2+ Peripheral Pulses, No clubbing, cyanosis, or edema  PSYCH: AAOx3        LABS:                        11.3   11.20 )-----------( 218      ( 10 Dec 2018 06:25 )             33.4     12-10    133<L>  |  98  |  12  ----------------------------<  98  4.0   |  24  |  0.53    Ca    8.9      10 Dec 2018 06:25  Phos  3.0     12-10  Mg     1.8     12-10                RADIOLOGY & ADDITIONAL TESTS:    Imaging Personally Reviewed:    Consultant(s) Notes Reviewed:  nephrology, pulmonary, ID     Care Discussed with Consultants/Other Providers:

## 2018-12-10 NOTE — PROGRESS NOTE ADULT - PROBLEM SELECTOR PLAN 4
Likely pre-renal, now resolved  with IVF  Monitor BMP, avoid nephrotoxic medications, renally dose medications

## 2018-12-10 NOTE — PROGRESS NOTE ADULT - SUBJECTIVE AND OBJECTIVE BOX
[facility] Division of Kidney Diseases & Hypertension  FOLLOW UP NOTE  [phone]--------------------------------------------------------------------------------  Chief Complaint:Sepsis      24 hour events/subjective:        PAST HISTORY  --------------------------------------------------------------------------------  No significant changes to PMH, PSH, FHx, SHx, unless otherwise noted    ALLERGIES & MEDICATIONS  --------------------------------------------------------------------------------  Allergies    No Known Allergies    Intolerances      Standing Inpatient Medications  ALBUTerol/ipratropium for Nebulization 3 milliLiter(s) Nebulizer every 4 hours  amLODIPine   Tablet 5 milliGRAM(s) Oral daily  cefTRIAXone   IVPB 1 Gram(s) IV Intermittent every 24 hours  enoxaparin Injectable 30 milliGRAM(s) SubCutaneous every 24 hours  tiotropium 18 MICROgram(s) Capsule 1 Capsule(s) Inhalation daily    PRN Inpatient Medications      REVIEW OF SYSTEMS  --------------------------------------------------------------------------------  Gen: No  fevers/chills  Skin: No rashes  Head/Eyes/Ears/Mouth: No headache; Normal hearing; Normal vision w/o blurriness  Respiratory: No dyspnea, cough, wheezing, hemoptysis  CV: No chest pain, PND, orthopnea  GI: No abdominal pain, diarrhea, constipation, nausea, vomiting  : No increased frequency, dysuria, hematuria, nocturia  MSK: No joint pain/swelling; no back pain; no edema  Neuro: No dizziness/lightheadedness, weakness, seizures, numbness, tingling      All other systems were reviewed and are negative, except as noted.    VITALS/PHYSICAL EXAM  --------------------------------------------------------------------------------  T(C): 36.6 (12-10-18 @ 05:07), Max: 36.8 (12-09-18 @ 20:10)  HR: 100 (12-10-18 @ 13:09) (89 - 104)  BP: 148/78 (12-10-18 @ 05:07) (148/78 - 153/69)  RR: 18 (12-10-18 @ 05:07) (18 - 18)  SpO2: 99% (12-10-18 @ 05:07) (99% - 100%)  Wt(kg): --        12-09-18 @ 07:01  -  12-10-18 @ 07:00  --------------------------------------------------------  IN: 0 mL / OUT: 2 mL / NET: -2 mL      Physical Exam:  	Gen: NAD, well-appearing  	HEENT: PERRL, supple neck, clear oropharynx  	Pulm: CTA B/L  	CV: RRR, S1S2;  	Back: No spinal or CVA tenderness  	Abd: +BS, soft, nontender/nondistended  	: No suprapubic tenderness                      Extremities: no bilateral LE edema noted.                       Neuro: No focal deficits, intact gait  	Skin: Warm, without rashes  	Vascular access:    LABS/STUDIES  --------------------------------------------------------------------------------              11.3   11.20 >-----------<  218      [12-10-18 @ 06:25]              33.4     133  |  98  |  12  ----------------------------<  98      [12-10-18 @ 06:25]  4.0   |  24  |  0.53        Ca     8.9     [12-10-18 @ 06:25]      Mg     1.8     [12-10-18 @ 00:30]      Phos  3.0     [12-10-18 @ 00:30]            Creatinine Trend:  SCr 0.53 [12-10 @ 06:25]  SCr 0.56 [12-10 @ 00:30]  SCr 0.58 [12-09 @ 18:50]  SCr 0.66 [12-09 @ 12:26]  SCr 0.54 [12-09 @ 06:26]    Urinalysis - [12-06-18 @ 00:40]      Color LIGHT ORANGE / Appearance TURBID / SG 1.014 / pH 6.0      Gluc NEGATIVE / Ketone NEGATIVE  / Bili NEGATIVE / Urobili NORMAL       Blood MODERATE / Protein 200 / Leuk Est LARGE / Nitrite POSITIVE      RBC 11-25 / WBC >50 / Hyaline 4+ / Gran  / Sq Epi FEW / Non Sq Epi  / Bacteria MODERATE    Urine Creatinine 39.70      [12-06-18 @ 18:10]  Urine Sodium 30      [12-06-18 @ 18:10]  Urine Potassium 31.7      [12-06-18 @ 18:10]  Urine Chloride 43      [12-06-18 @ 18:10] [facility] Division of Kidney Diseases & Hypertension  FOLLOW UP NOTE  [phone]--------------------------------------------------------------------------------    HPI: 89 yo F with history of HTN is admitted to [facility] with altered mental status 2/2 E. Coli UTI/bacteremia. Nephrology was consulted for NEREIDA and hyponatremia. On admission (12/6/18) patient was found to have decreased serum sodium of 124, which subsequently improved to 131 on 12/7/18 after IVFs, however slightly worsened to 130 today (12/8/18). Patient also found to have elevated Scr of 1.34 on admission (12/6/18), which improved to 0.56 on 12/8/18.     Patient seen and examined. Denies c/o SOB, CP, abdominal pain, nausea, or vomiting.     PAST HISTORY  --------------------------------------------------------------------------------  No significant changes to PMH, PSH, FHx, SHx, unless otherwise noted    ALLERGIES & MEDICATIONS  --------------------------------------------------------------------------------  Allergies    No Known Allergies    Intolerances      Standing Inpatient Medications  ALBUTerol/ipratropium for Nebulization 3 milliLiter(s) Nebulizer every 4 hours  amLODIPine   Tablet 5 milliGRAM(s) Oral daily  cefTRIAXone   IVPB 1 Gram(s) IV Intermittent every 24 hours  enoxaparin Injectable 30 milliGRAM(s) SubCutaneous every 24 hours  tiotropium 18 MICROgram(s) Capsule 1 Capsule(s) Inhalation daily    PRN Inpatient Medications      REVIEW OF SYSTEMS  --------------------------------------------------------------------------------  Gen: + lethargy, + fatigue  Respiratory: No dyspnea  CV: No chest pain  GI: No abdominal pain  MSK: No LE edema  Neuro: No dizziness  Heme: No bleeding  Psych: No depression  Skin: No rash    VITALS/PHYSICAL EXAM  --------------------------------------------------------------------------------  T(C): 36.6 (12-10-18 @ 05:07), Max: 36.8 (12-09-18 @ 20:10)  HR: 100 (12-10-18 @ 13:09) (89 - 104)  BP: 148/78 (12-10-18 @ 05:07) (148/78 - 153/69)  RR: 18 (12-10-18 @ 05:07) (18 - 18)  SpO2: 99% (12-10-18 @ 05:07) (99% - 100%)  Wt(kg): --        12-09-18 @ 07:01  -  12-10-18 @ 07:00  --------------------------------------------------------  IN: 0 mL / OUT: 2 mL / NET: -2 mL      Physical Exam:  	  Gen: elderly female in NAD  	HEENT: Supple neck  	Pulm: CTA B/L  	CV: RRR, S1S2; no rub  	Abd: +BS, soft, nontender/nondistended  	: No suprapubic tenderness  	LE: No edema  	Skin: Warm, without rashes  	Psych: Normal affect    LABS/STUDIES  --------------------------------------------------------------------------------              11.3   11.20 >-----------<  218      [12-10-18 @ 06:25]              33.4     133  |  98  |  12  ----------------------------<  98      [12-10-18 @ 06:25]  4.0   |  24  |  0.53        Ca     8.9     [12-10-18 @ 06:25]      Mg     1.8     [12-10-18 @ 00:30]      Phos  3.0     [12-10-18 @ 00:30]            Creatinine Trend:  SCr 0.53 [12-10 @ 06:25]  SCr 0.56 [12-10 @ 00:30]  SCr 0.58 [12-09 @ 18:50]  SCr 0.66 [12-09 @ 12:26]  SCr 0.54 [12-09 @ 06:26]    Urinalysis - [12-06-18 @ 00:40]      Color LIGHT ORANGE / Appearance TURBID / SG 1.014 / pH 6.0      Gluc NEGATIVE / Ketone NEGATIVE  / Bili NEGATIVE / Urobili NORMAL       Blood MODERATE / Protein 200 / Leuk Est LARGE / Nitrite POSITIVE      RBC 11-25 / WBC >50 / Hyaline 4+ / Gran  / Sq Epi FEW / Non Sq Epi  / Bacteria MODERATE    Urine Creatinine 39.70      [12-06-18 @ 18:10]  Urine Sodium 30      [12-06-18 @ 18:10]  Urine Potassium 31.7      [12-06-18 @ 18:10]  Urine Chloride 43      [12-06-18 @ 18:10]

## 2018-12-10 NOTE — PROGRESS NOTE ADULT - SUBJECTIVE AND OBJECTIVE BOX
ARASH SANDOVAL 89y [id number]    Patient is a 89y old  Female who presents with a chief complaint of pain and fever (10 Dec 2018 11:24)      Follow Up/CC:  ID following for fever    Interval History/ROS: tired, wheezing, daughter at bedside helped with hx/translation    Allergies    No Known Allergies    Intolerances        ANTIMICROBIALS:  cefTRIAXone   IVPB 1 every 24 hours      MEDICATIONS  (STANDING):  ALBUTerol/ipratropium for Nebulization 3 milliLiter(s) Nebulizer every 4 hours  amLODIPine   Tablet 5 milliGRAM(s) Oral daily  cefTRIAXone   IVPB 1 Gram(s) IV Intermittent every 24 hours  enoxaparin Injectable 30 milliGRAM(s) SubCutaneous every 24 hours  tiotropium 18 MICROgram(s) Capsule 1 Capsule(s) Inhalation daily    MEDICATIONS  (PRN):        Vital Signs Last 24 Hrs  T(C): 36.6 (10 Dec 2018 05:07), Max: 36.8 (09 Dec 2018 20:10)  T(F): 97.9 (10 Dec 2018 05:07), Max: 98.3 (09 Dec 2018 20:10)  HR: 89 (10 Dec 2018 05:07) (89 - 100)  BP: 148/78 (10 Dec 2018 05:07) (148/78 - 153/69)  BP(mean): --  RR: 18 (10 Dec 2018 05:07) (18 - 18)  SpO2: 99% (10 Dec 2018 05:07) (99% - 100%)    CBC Full  -  ( 10 Dec 2018 06:25 )  WBC Count : 11.20 K/uL  Hemoglobin : 11.3 g/dL  Hematocrit : 33.4 %  Platelet Count - Automated : 218 K/uL  Mean Cell Volume : 65.4 fL  Mean Cell Hemoglobin : 22.1 pg  Mean Cell Hemoglobin Concentration : 33.8 %  Auto Neutrophil # : x  Auto Lymphocyte # : x  Auto Monocyte # : x  Auto Eosinophil # : x  Auto Basophil # : x  Auto Neutrophil % : x  Auto Lymphocyte % : x  Auto Monocyte % : x  Auto Eosinophil % : x  Auto Basophil % : x    12-10    133<L>  |  98  |  12  ----------------------------<  98  4.0   |  24  |  0.53    Ca    8.9      10 Dec 2018 06:25  Phos  3.0     12-10  Mg     1.8     12-10            MICROBIOLOGY:  BLOOD  12-07-18 --  --  --      URINE CATHETER  12-06-18 --  --  Escherichia coli      BLOOD PERIPHERAL  12-06-18 --  --  BLOOD CULTURE PCR  Escherichia coli      RADIOLOGY    Xray Chest 1 View- PORTABLE-Urgent (12.08.18 @ 12:12) >  No acute pulmonary disease.

## 2018-12-11 LAB
BUN SERPL-MCNC: 12 MG/DL — SIGNIFICANT CHANGE UP (ref 7–23)
CALCIUM SERPL-MCNC: 9.4 MG/DL — SIGNIFICANT CHANGE UP (ref 8.4–10.5)
CHLORIDE SERPL-SCNC: 94 MMOL/L — LOW (ref 98–107)
CO2 SERPL-SCNC: 28 MMOL/L — SIGNIFICANT CHANGE UP (ref 22–31)
CREAT SERPL-MCNC: 0.6 MG/DL — SIGNIFICANT CHANGE UP (ref 0.5–1.3)
GLUCOSE SERPL-MCNC: 128 MG/DL — HIGH (ref 70–99)
HCT VFR BLD CALC: 33.3 % — LOW (ref 34.5–45)
HGB BLD-MCNC: 11 G/DL — LOW (ref 11.5–15.5)
MAGNESIUM SERPL-MCNC: 1.9 MG/DL — SIGNIFICANT CHANGE UP (ref 1.6–2.6)
MCHC RBC-ENTMCNC: 21.7 PG — LOW (ref 27–34)
MCHC RBC-ENTMCNC: 33 % — SIGNIFICANT CHANGE UP (ref 32–36)
MCV RBC AUTO: 65.6 FL — LOW (ref 80–100)
NRBC # FLD: 0 — SIGNIFICANT CHANGE UP
PHOSPHATE SERPL-MCNC: 3 MG/DL — SIGNIFICANT CHANGE UP (ref 2.5–4.5)
PLATELET # BLD AUTO: 294 K/UL — SIGNIFICANT CHANGE UP (ref 150–400)
PMV BLD: 10.4 FL — SIGNIFICANT CHANGE UP (ref 7–13)
POTASSIUM SERPL-MCNC: 4 MMOL/L — SIGNIFICANT CHANGE UP (ref 3.5–5.3)
POTASSIUM SERPL-SCNC: 4 MMOL/L — SIGNIFICANT CHANGE UP (ref 3.5–5.3)
RBC # BLD: 5.08 M/UL — SIGNIFICANT CHANGE UP (ref 3.8–5.2)
RBC # FLD: 14.3 % — SIGNIFICANT CHANGE UP (ref 10.3–14.5)
SODIUM SERPL-SCNC: 132 MMOL/L — LOW (ref 135–145)
WBC # BLD: 11.2 K/UL — HIGH (ref 3.8–10.5)
WBC # FLD AUTO: 11.2 K/UL — HIGH (ref 3.8–10.5)

## 2018-12-11 PROCEDURE — 93010 ELECTROCARDIOGRAM REPORT: CPT

## 2018-12-11 PROCEDURE — 99232 SBSQ HOSP IP/OBS MODERATE 35: CPT

## 2018-12-11 PROCEDURE — 99233 SBSQ HOSP IP/OBS HIGH 50: CPT

## 2018-12-11 PROCEDURE — 99232 SBSQ HOSP IP/OBS MODERATE 35: CPT | Mod: GC

## 2018-12-11 RX ADMIN — Medication 3 MILLILITER(S): at 05:36

## 2018-12-11 RX ADMIN — Medication 3 MILLILITER(S): at 08:52

## 2018-12-11 RX ADMIN — CEFTRIAXONE 100 GRAM(S): 500 INJECTION, POWDER, FOR SOLUTION INTRAMUSCULAR; INTRAVENOUS at 13:02

## 2018-12-11 RX ADMIN — Medication 3 MILLILITER(S): at 17:52

## 2018-12-11 RX ADMIN — ENOXAPARIN SODIUM 30 MILLIGRAM(S): 100 INJECTION SUBCUTANEOUS at 18:06

## 2018-12-11 RX ADMIN — Medication 3 MILLILITER(S): at 12:48

## 2018-12-11 RX ADMIN — Medication 3 MILLILITER(S): at 01:12

## 2018-12-11 RX ADMIN — Medication 3 MILLILITER(S): at 20:50

## 2018-12-11 RX ADMIN — AMLODIPINE BESYLATE 5 MILLIGRAM(S): 2.5 TABLET ORAL at 05:09

## 2018-12-11 NOTE — PROGRESS NOTE ADULT - SUBJECTIVE AND OBJECTIVE BOX
Patient is a 89y old  Female who presents with a chief complaint of pain and fever (11 Dec 2018 10:54)      SUBJECTIVE / OVERNIGHT EVENTS: patient seen and examined by bedside, pt afebrile , no acute distress noted  daughter at bedside translating         MEDICATIONS  (STANDING):  ALBUTerol/ipratropium for Nebulization 3 milliLiter(s) Nebulizer every 4 hours  amLODIPine   Tablet 5 milliGRAM(s) Oral daily  cefTRIAXone   IVPB 1 Gram(s) IV Intermittent every 24 hours  enoxaparin Injectable 30 milliGRAM(s) SubCutaneous every 24 hours  tiotropium 18 MICROgram(s) Capsule 1 Capsule(s) Inhalation daily    MEDICATIONS  (PRN):      Vital Signs Last 24 Hrs  T(C): 36.3 (11 Dec 2018 12:55), Max: 36.7 (10 Dec 2018 22:11)  T(F): 97.3 (11 Dec 2018 12:55), Max: 98.1 (10 Dec 2018 22:11)  HR: 121 (11 Dec 2018 12:55) (81 - 121)  BP: 156/83 (11 Dec 2018 12:55) (118/67 - 156/83)  BP(mean): --  RR: 18 (11 Dec 2018 12:55) (18 - 18)  SpO2: 100% (11 Dec 2018 12:55) (96% - 100%)  CAPILLARY BLOOD GLUCOSE        I&O's Summary    PHYSICAL EXAM  GENERAL: Elderly woman laying in bed, appears comfortable,   HEAD:  Atraumatic, Normocephalic  EYES: EOMI, PERRLA, conjunctiva and sclera clear  NECK: Supple,   CHEST/LUNG: bibasilar crept, no wheezing today    HEART: Regular rate and rhythm;  ABDOMEN: Soft, Nontender, Nondistended; Bowel sounds present  EXTREMITIES:  2+ Peripheral Pulses, No clubbing, cyanosis, or edema  PSYCH: AAOx3    LABS:                        11.0   11.20 )-----------( 294      ( 11 Dec 2018 07:20 )             33.3     12-11    132<L>  |  94<L>  |  12  ----------------------------<  128<H>  4.0   |  28  |  0.60    Ca    9.4      11 Dec 2018 07:20  Phos  3.0     12-11  Mg     1.9     12-11                RADIOLOGY & ADDITIONAL TESTS:  < from: CT Abdomen and Pelvis w/ Oral Cont and w/ IV Cont (12.10.18 @ 15:08) >    Mild cardiomegaly.  *  Small bilateral pleural effusions with passive atelectasis of the   bilateral lower lobes.  *  No bowel obstruction.  *  Age indeterminate compression deformities of T8, T11, L3, L4, and L5.       < end of copied text >    Imaging Personally Reviewed:    Consultant(s) Notes Reviewed:  pulmonary , ID     Care Discussed with Consultants/Other Providers:

## 2018-12-11 NOTE — PROGRESS NOTE ADULT - SUBJECTIVE AND OBJECTIVE BOX
ARASH SANDOVAL 89y [id number]    Patient is a 89y old  Female who presents with a chief complaint of pain and fever (11 Dec 2018 10:10)      Follow Up/CC:  ID following for bacteremia    Interval History/ROS: doing better, wheezing better    Allergies    No Known Allergies    Intolerances        ANTIMICROBIALS:  cefTRIAXone   IVPB 1 every 24 hours      MEDICATIONS  (STANDING):  ALBUTerol/ipratropium for Nebulization 3 milliLiter(s) Nebulizer every 4 hours  amLODIPine   Tablet 5 milliGRAM(s) Oral daily  cefTRIAXone   IVPB 1 Gram(s) IV Intermittent every 24 hours  enoxaparin Injectable 30 milliGRAM(s) SubCutaneous every 24 hours  tiotropium 18 MICROgram(s) Capsule 1 Capsule(s) Inhalation daily    MEDICATIONS  (PRN):        Vital Signs Last 24 Hrs  T(C): 36.7 (11 Dec 2018 05:04), Max: 36.7 (10 Dec 2018 22:11)  T(F): 98.1 (11 Dec 2018 05:04), Max: 98.1 (10 Dec 2018 22:11)  HR: 101 (11 Dec 2018 08:52) (81 - 117)  BP: 118/67 (11 Dec 2018 05:04) (118/67 - 147/80)  BP(mean): --  RR: 18 (11 Dec 2018 05:04) (18 - 18)  SpO2: 99% (11 Dec 2018 08:52) (96% - 100%)    CBC Full  -  ( 11 Dec 2018 07:20 )  WBC Count : 11.20 K/uL  Hemoglobin : 11.0 g/dL  Hematocrit : 33.3 %  Platelet Count - Automated : 294 K/uL  Mean Cell Volume : 65.6 fL  Mean Cell Hemoglobin : 21.7 pg  Mean Cell Hemoglobin Concentration : 33.0 %  Auto Neutrophil # : x  Auto Lymphocyte # : x  Auto Monocyte # : x  Auto Eosinophil # : x  Auto Basophil # : x  Auto Neutrophil % : x  Auto Lymphocyte % : x  Auto Monocyte % : x  Auto Eosinophil % : x  Auto Basophil % : x    12-11    132<L>  |  94<L>  |  12  ----------------------------<  128<H>  4.0   |  28  |  0.60    Ca    9.4      11 Dec 2018 07:20  Phos  3.0     12-11  Mg     1.9     12-11            MICROBIOLOGY:  BLOOD  12-07-18 --  --  --      URINE CATHETER  12-06-18 --  --  Escherichia coli      BLOOD PERIPHERAL  12-06-18 --  --  BLOOD CULTURE PCR  Escherichia coli    RADIOLOGY    CT Chest/abd/pelvis w/ IV Cont (12.10.18 @ 15:08) >  *  Mild cardiomegaly.  *  Small bilateral pleural effusions with passive atelectasis of the   bilateral lower lobes.  *  No bowel obstruction.  *  Age indeterminate compression deformities of T8, T11, L3, L4, and L5.

## 2018-12-11 NOTE — PROGRESS NOTE ADULT - ATTENDING COMMENTS
Jake Montoya  Attending Physician   Division of Infectious Disease  Pager #[phone]  After 5pm/weekend or no response, call #[phone]

## 2018-12-11 NOTE — PROGRESS NOTE ADULT - PROBLEM SELECTOR PLAN 2
pt was noted to have Diffuse wheezing on exam before , no hx of pulmonary disease, TTE with normal LVSF, no documented diastolic dysfunction, infection less likely as patient remains afebrile with improvement in leukocytosis, CXR clear, RVP neg x2; possible reactive airway disease and/or atelectasis   CT chest with IV contrast shows Small bilateral pleural effusions with passive atelectasis of the bilateral lower lobes.  Conservative management for now, nebs q6h standing during day and prn at night , chest PT   OOB to chair when possible  Pulmonary  recommendations appreciated

## 2018-12-11 NOTE — PROGRESS NOTE ADULT - SUBJECTIVE AND OBJECTIVE BOX
CHIEF COMPLAINT: sob    Interval Events:  No events overnight.  Daughter says patient's breathing much improved.  No wheezing or sob overnight, cough improved.  Patient more alert today.    REVIEW OF SYSTEMS:  Constitutional: [ ] negative [ ] fevers [ ] chills [ ] weight loss [ ] weight gain  HEENT: [ ] negative [ ] dry eyes [ ] eye irritation [ ] postnasal drip [ ] nasal congestion  CV: [ ] negative  [ ] chest pain [ ] orthopnea [ ] palpitations [ ] murmur  Resp: [ ] negative [x ] cough [ ] shortness of breath [ ] dyspnea [ ] wheezing [ ] sputum [ ] hemoptysis  GI: [ ] negative [ ] nausea [ ] vomiting [ ] diarrhea [ ] constipation [ ] abd pain [ ] dysphagia   : [ ] negative [ ] dysuria [ ] nocturia [ ] hematuria [ ] increased urinary frequency  Musculoskeletal: [ ] negative [ ] back pain [ ] myalgias [ ] arthralgias [ ] fracture  Skin: [ ] negative [ ] rash [ ] itch  Neurological: [ ] negative [ ] headache [ ] dizziness [ ] syncope [ ] weakness [ ] numbness  Psychiatric: [ ] negative [ ] anxiety [ ] depression  Endocrine: [ ] negative [ ] diabetes [ ] thyroid problem  Hematologic/Lymphatic: [ ] negative [ ] anemia [ ] bleeding problem  Allergic/Immunologic: [ ] negative [ ] itchy eyes [ ] nasal discharge [ ] hives [ ] angioedema  [x ] All other systems negative except as above  [ ] Unable to assess ROS because ________    OBJECTIVE:  ICU Vital Signs Last 24 Hrs  T(C): 36.7 (11 Dec 2018 05:04), Max: 36.7 (10 Dec 2018 22:11)  T(F): 98.1 (11 Dec 2018 05:04), Max: 98.1 (10 Dec 2018 22:11)  HR: 101 (11 Dec 2018 08:52) (81 - 117)  BP: 118/67 (11 Dec 2018 05:04) (118/67 - 147/80)  BP(mean): --  ABP: --  ABP(mean): --  RR: 18 (11 Dec 2018 05:04) (18 - 18)  SpO2: 99% (11 Dec 2018 08:52) (96% - 100%)        CAPILLARY BLOOD GLUCOSE          PHYSICAL EXAM:    General: NAD, arousable  HEENT: NCAT, moist mucosal membranes  Respiratory: rhonchi at bases bilaterally but no wheezing today  Cardiovascular: S1/S2 normal, RRR, no murmurs/rubs/gallops appreciated  Abdomen: soft, non-tender, non-distended with normal bowel sounds  Extremities: no b/l LE edema  Skin: warm/dry  Neurological: awake and alert  Psych: pleasant affect  msk: moving all limbs spontaneously      HOSPITAL MEDICATIONS:  MEDICATIONS  (STANDING):  ALBUTerol/ipratropium for Nebulization 3 milliLiter(s) Nebulizer every 4 hours  amLODIPine   Tablet 5 milliGRAM(s) Oral daily  cefTRIAXone   IVPB 1 Gram(s) IV Intermittent every 24 hours  enoxaparin Injectable 30 milliGRAM(s) SubCutaneous every 24 hours  tiotropium 18 MICROgram(s) Capsule 1 Capsule(s) Inhalation daily    MEDICATIONS  (PRN):      LABS:                        11.0   11.20 )-----------( 294      ( 11 Dec 2018 07:20 )             33.3     Hgb Trend: 11.0<--, 11.3<--, 11.1<--, 10.8<--, 11.6<--  12-11    132<L>  |  94<L>  |  12  ----------------------------<  128<H>  4.0   |  28  |  0.60    Ca    9.4      11 Dec 2018 07:20  Phos  3.0     12-11  Mg     1.9     12-11      Creatinine Trend: 0.60<--, 0.53<--, 0.56<--, 0.58<--, 0.66<--, 0.54<--            MICROBIOLOGY:       RADIOLOGY:  [ ] Reviewed and interpreted by me    PULMONARY FUNCTION TESTS:    EKG:

## 2018-12-11 NOTE — PROGRESS NOTE ADULT - ATTENDING COMMENTS
89 year old woman with E. coli UTI and bacteremia, found to have diffuse wheezing during hospitalization.     wheezing has resolved   continue nebulizers ATC  OOB to chair and chest PT    will follow

## 2018-12-11 NOTE — PROGRESS NOTE ADULT - PROBLEM SELECTOR PLAN 1
Sepsis 2/2 E. Coli bacteremia likely from UTI  Blood and urine cx sensitivities inconsistent, both E. coli; repeat bcx 12/7 NTD  c/w  IV ceftriaxone 1 gm q24  CT abdo/pelvis with po/IV contrast to r/o GI source was unremarkable   ID following, recommendations appreciated

## 2018-12-11 NOTE — PROGRESS NOTE ADULT - PROBLEM SELECTOR PLAN 5
Hyponatremia, initial improvement with IVF, suspected due to poor nutritional intake; urine lytes obtained during IVF administration  IVF DC, c/w fluid restriction, add ensure to diet  Hypokalemia; resolved   Nephrology recommendations appreciated

## 2018-12-12 DIAGNOSIS — R14.0 ABDOMINAL DISTENSION (GASEOUS): ICD-10-CM

## 2018-12-12 LAB
APPEARANCE UR: CLEAR — SIGNIFICANT CHANGE UP
BACTERIA # UR AUTO: NEGATIVE — SIGNIFICANT CHANGE UP
BACTERIA BLD CULT: SIGNIFICANT CHANGE UP
BACTERIA BLD CULT: SIGNIFICANT CHANGE UP
BILIRUB UR-MCNC: NEGATIVE — SIGNIFICANT CHANGE UP
BLOOD UR QL VISUAL: NEGATIVE — SIGNIFICANT CHANGE UP
BUN SERPL-MCNC: 26 MG/DL — HIGH (ref 7–23)
CALCIUM SERPL-MCNC: 9.2 MG/DL — SIGNIFICANT CHANGE UP (ref 8.4–10.5)
CHLORIDE SERPL-SCNC: 98 MMOL/L — SIGNIFICANT CHANGE UP (ref 98–107)
CO2 SERPL-SCNC: 25 MMOL/L — SIGNIFICANT CHANGE UP (ref 22–31)
COLOR SPEC: SIGNIFICANT CHANGE UP
CREAT SERPL-MCNC: 0.55 MG/DL — SIGNIFICANT CHANGE UP (ref 0.5–1.3)
GLUCOSE SERPL-MCNC: 136 MG/DL — HIGH (ref 70–99)
GLUCOSE UR-MCNC: NEGATIVE — SIGNIFICANT CHANGE UP
HCT VFR BLD CALC: 33.8 % — LOW (ref 34.5–45)
HGB BLD-MCNC: 10.8 G/DL — LOW (ref 11.5–15.5)
HYALINE CASTS # UR AUTO: NEGATIVE — SIGNIFICANT CHANGE UP
KETONES UR-MCNC: NEGATIVE — SIGNIFICANT CHANGE UP
LEUKOCYTE ESTERASE UR-ACNC: SIGNIFICANT CHANGE UP
MCHC RBC-ENTMCNC: 21.8 PG — LOW (ref 27–34)
MCHC RBC-ENTMCNC: 32 % — SIGNIFICANT CHANGE UP (ref 32–36)
MCV RBC AUTO: 68.3 FL — LOW (ref 80–100)
NITRITE UR-MCNC: NEGATIVE — SIGNIFICANT CHANGE UP
NRBC # FLD: 0 — SIGNIFICANT CHANGE UP
PH UR: 7.5 — SIGNIFICANT CHANGE UP (ref 5–8)
PLATELET # BLD AUTO: 375 K/UL — SIGNIFICANT CHANGE UP (ref 150–400)
PMV BLD: 10 FL — SIGNIFICANT CHANGE UP (ref 7–13)
POTASSIUM SERPL-MCNC: 4 MMOL/L — SIGNIFICANT CHANGE UP (ref 3.5–5.3)
POTASSIUM SERPL-SCNC: 4 MMOL/L — SIGNIFICANT CHANGE UP (ref 3.5–5.3)
PROT UR-MCNC: 20 — SIGNIFICANT CHANGE UP
RBC # BLD: 4.95 M/UL — SIGNIFICANT CHANGE UP (ref 3.8–5.2)
RBC # FLD: 14.1 % — SIGNIFICANT CHANGE UP (ref 10.3–14.5)
RBC CASTS # UR COMP ASSIST: SIGNIFICANT CHANGE UP (ref 0–?)
SODIUM SERPL-SCNC: 135 MMOL/L — SIGNIFICANT CHANGE UP (ref 135–145)
SP GR SPEC: 1.02 — SIGNIFICANT CHANGE UP (ref 1–1.04)
SQUAMOUS # UR AUTO: SIGNIFICANT CHANGE UP
UROBILINOGEN FLD QL: NORMAL — SIGNIFICANT CHANGE UP
WBC # BLD: 15.56 K/UL — HIGH (ref 3.8–10.5)
WBC # FLD AUTO: 15.56 K/UL — HIGH (ref 3.8–10.5)
WBC UR QL: HIGH (ref 0–?)

## 2018-12-12 PROCEDURE — 99232 SBSQ HOSP IP/OBS MODERATE 35: CPT

## 2018-12-12 PROCEDURE — 74019 RADEX ABDOMEN 2 VIEWS: CPT | Mod: 26

## 2018-12-12 PROCEDURE — 93010 ELECTROCARDIOGRAM REPORT: CPT

## 2018-12-12 PROCEDURE — 99233 SBSQ HOSP IP/OBS HIGH 50: CPT

## 2018-12-12 PROCEDURE — 71045 X-RAY EXAM CHEST 1 VIEW: CPT | Mod: 26

## 2018-12-12 RX ORDER — SENNA PLUS 8.6 MG/1
2 TABLET ORAL AT BEDTIME
Qty: 0 | Refills: 0 | Status: DISCONTINUED | OUTPATIENT
Start: 2018-12-12 | End: 2018-12-17

## 2018-12-12 RX ORDER — SODIUM CHLORIDE 9 MG/ML
500 INJECTION INTRAMUSCULAR; INTRAVENOUS; SUBCUTANEOUS ONCE
Qty: 0 | Refills: 0 | Status: DISCONTINUED | OUTPATIENT
Start: 2018-12-12 | End: 2018-12-12

## 2018-12-12 RX ORDER — SODIUM CHLORIDE 9 MG/ML
1000 INJECTION, SOLUTION INTRAVENOUS
Qty: 0 | Refills: 0 | Status: DISCONTINUED | OUTPATIENT
Start: 2018-12-12 | End: 2018-12-17

## 2018-12-12 RX ORDER — LEVALBUTEROL 1.25 MG/.5ML
0.63 SOLUTION, CONCENTRATE RESPIRATORY (INHALATION) EVERY 4 HOURS
Qty: 0 | Refills: 0 | Status: DISCONTINUED | OUTPATIENT
Start: 2018-12-12 | End: 2018-12-13

## 2018-12-12 RX ORDER — SODIUM CHLORIDE 9 MG/ML
500 INJECTION INTRAMUSCULAR; INTRAVENOUS; SUBCUTANEOUS ONCE
Qty: 0 | Refills: 0 | Status: COMPLETED | OUTPATIENT
Start: 2018-12-12 | End: 2018-12-12

## 2018-12-12 RX ORDER — ATENOLOL 25 MG/1
25 TABLET ORAL DAILY
Qty: 0 | Refills: 0 | Status: DISCONTINUED | OUTPATIENT
Start: 2018-12-13 | End: 2018-12-17

## 2018-12-12 RX ORDER — DOCUSATE SODIUM 100 MG
100 CAPSULE ORAL
Qty: 0 | Refills: 0 | Status: DISCONTINUED | OUTPATIENT
Start: 2018-12-12 | End: 2018-12-17

## 2018-12-12 RX ORDER — ATENOLOL 25 MG/1
25 TABLET ORAL ONCE
Qty: 0 | Refills: 0 | Status: COMPLETED | OUTPATIENT
Start: 2018-12-12 | End: 2018-12-12

## 2018-12-12 RX ORDER — METOCLOPRAMIDE HCL 10 MG
5 TABLET ORAL ONCE
Qty: 0 | Refills: 0 | Status: DISCONTINUED | OUTPATIENT
Start: 2018-12-12 | End: 2018-12-17

## 2018-12-12 RX ORDER — ACETAMINOPHEN 500 MG
650 TABLET ORAL ONCE
Qty: 0 | Refills: 0 | Status: COMPLETED | OUTPATIENT
Start: 2018-12-12 | End: 2018-12-12

## 2018-12-12 RX ORDER — IPRATROPIUM BROMIDE 0.2 MG/ML
500 SOLUTION, NON-ORAL INHALATION EVERY 4 HOURS
Qty: 0 | Refills: 0 | Status: DISCONTINUED | OUTPATIENT
Start: 2018-12-12 | End: 2018-12-13

## 2018-12-12 RX ADMIN — AMLODIPINE BESYLATE 5 MILLIGRAM(S): 2.5 TABLET ORAL at 06:12

## 2018-12-12 RX ADMIN — LEVALBUTEROL 0.63 MILLIGRAM(S): 1.25 SOLUTION, CONCENTRATE RESPIRATORY (INHALATION) at 17:39

## 2018-12-12 RX ADMIN — LEVALBUTEROL 0.63 MILLIGRAM(S): 1.25 SOLUTION, CONCENTRATE RESPIRATORY (INHALATION) at 09:20

## 2018-12-12 RX ADMIN — Medication 500 MICROGRAM(S): at 09:20

## 2018-12-12 RX ADMIN — Medication 650 MILLIGRAM(S): at 06:12

## 2018-12-12 RX ADMIN — ENOXAPARIN SODIUM 30 MILLIGRAM(S): 100 INJECTION SUBCUTANEOUS at 17:13

## 2018-12-12 RX ADMIN — ATENOLOL 25 MILLIGRAM(S): 25 TABLET ORAL at 06:56

## 2018-12-12 RX ADMIN — Medication 500 MICROGRAM(S): at 21:47

## 2018-12-12 RX ADMIN — Medication 100 MILLIGRAM(S): at 22:01

## 2018-12-12 RX ADMIN — LEVALBUTEROL 0.63 MILLIGRAM(S): 1.25 SOLUTION, CONCENTRATE RESPIRATORY (INHALATION) at 12:59

## 2018-12-12 RX ADMIN — Medication 3 MILLILITER(S): at 04:50

## 2018-12-12 RX ADMIN — SENNA PLUS 2 TABLET(S): 8.6 TABLET ORAL at 22:01

## 2018-12-12 RX ADMIN — Medication 500 MICROGRAM(S): at 12:59

## 2018-12-12 RX ADMIN — SODIUM CHLORIDE 500 MILLILITER(S): 9 INJECTION INTRAMUSCULAR; INTRAVENOUS; SUBCUTANEOUS at 06:13

## 2018-12-12 RX ADMIN — LEVALBUTEROL 0.63 MILLIGRAM(S): 1.25 SOLUTION, CONCENTRATE RESPIRATORY (INHALATION) at 21:47

## 2018-12-12 RX ADMIN — CEFTRIAXONE 100 GRAM(S): 500 INJECTION, POWDER, FOR SOLUTION INTRAMUSCULAR; INTRAVENOUS at 13:05

## 2018-12-12 RX ADMIN — Medication 3 MILLILITER(S): at 00:36

## 2018-12-12 RX ADMIN — SODIUM CHLORIDE 50 MILLILITER(S): 9 INJECTION, SOLUTION INTRAVENOUS at 22:04

## 2018-12-12 RX ADMIN — Medication 500 MICROGRAM(S): at 17:39

## 2018-12-12 NOTE — PROGRESS NOTE ADULT - SUBJECTIVE AND OBJECTIVE BOX
ARASH SANDOVAL 89y [id number]    Patient is a 89y old  Female who presents with a chief complaint of pain and fever (11 Dec 2018 12:56)      Follow Up/CC:  ID following for bacteremia    Interval History/ROS: vomiting o/n, no fever    Allergies    No Known Allergies    Intolerances        ANTIMICROBIALS:  cefTRIAXone   IVPB 1 every 24 hours      MEDICATIONS  (STANDING):  amLODIPine   Tablet 5 milliGRAM(s) Oral daily  cefTRIAXone   IVPB 1 Gram(s) IV Intermittent every 24 hours  enoxaparin Injectable 30 milliGRAM(s) SubCutaneous every 24 hours  ipratropium    for Nebulization 500 MICROGram(s) Nebulizer every 4 hours  levalbuterol Inhalation 0.63 milliGRAM(s) Inhalation every 4 hours  tiotropium 18 MICROgram(s) Capsule 1 Capsule(s) Inhalation daily    MEDICATIONS  (PRN):        Vital Signs Last 24 Hrs  T(C): 37.4 (12 Dec 2018 08:31), Max: 37.8 (12 Dec 2018 05:52)  T(F): 99.4 (12 Dec 2018 08:31), Max: 100 (12 Dec 2018 05:52)  HR: 100 (12 Dec 2018 09:20) (100 - 141)  BP: 140/80 (12 Dec 2018 08:31) (138/78 - 156/83)  BP(mean): --  RR: 18 (12 Dec 2018 08:31) (16 - 18)  SpO2: 97% (12 Dec 2018 09:20) (95% - 100%)    CBC Full  -  ( 11 Dec 2018 07:20 )  WBC Count : 11.20 K/uL  Hemoglobin : 11.0 g/dL  Hematocrit : 33.3 %  Platelet Count - Automated : 294 K/uL  Mean Cell Volume : 65.6 fL  Mean Cell Hemoglobin : 21.7 pg  Mean Cell Hemoglobin Concentration : 33.0 %  Auto Neutrophil # : x  Auto Lymphocyte # : x  Auto Monocyte # : x  Auto Eosinophil # : x  Auto Basophil # : x  Auto Neutrophil % : x  Auto Lymphocyte % : x  Auto Monocyte % : x  Auto Eosinophil % : x  Auto Basophil % : x    12-11    132<L>  |  94<L>  |  12  ----------------------------<  128<H>  4.0   |  28  |  0.60    Ca    9.4      11 Dec 2018 07:20  Phos  3.0     12-11  Mg     1.9     12-11            MICROBIOLOGY:  BLOOD  12-07-18 --  --  --      URINE CATHETER  12-06-18 --  --  Escherichia coli      BLOOD PERIPHERAL  12-06-18 --  --  BLOOD CULTURE PCR  Escherichia coli        RADIOLOGY    CT Chest w/ IV Cont (12.10.18 @ 15:08) >  *  Mild cardiomegaly.  *  Small bilateral pleural effusions with passive atelectasis of the   bilateral lower lobes.  *  No bowel obstruction.  *  Age indeterminate compression deformities of T8, T11, L3, L4, and L5.

## 2018-12-12 NOTE — CHART NOTE - NSCHARTNOTEFT_GEN_A_CORE
Spoke with ID regarding temperature of 100 F in AM. Recommend to draw BCx, UA, and CXR.     ADS   [id number]

## 2018-12-12 NOTE — PROGRESS NOTE ADULT - PROBLEM SELECTOR PLAN 3
TTE with normal LVSF, no documented diastolic dysfunction  CXR clear, RVP neg x2; possible reactive airway disease and/or atelectasis   CT chest with IV contrast shows Small bilateral pleural effusions with passive atelectasis of the bilateral lower lobes.  Conservative management for now, nebs q6h standing during day and prn at night , chest PT   OOB to chair when possible  Pulmonary  recommendations appreciated

## 2018-12-12 NOTE — PROVIDER CONTACT NOTE (OTHER) - BACKGROUND
Pt admitted for sepsis
89 F Chinese speaking who was brought in by family complaining of weakness and fevers since Monday night
89 F Chinese speaking who was brought in by family complaining of weakness and fevers since Monday night.
Patient admitted for sepsis
Patient admitted for sepsis.
Sepsis
pt adm for sepsis, (+) UTI, contact r/o ESBL

## 2018-12-12 NOTE — CHART NOTE - NSCHARTNOTEFT_GEN_A_CORE
Pt seen and examined at bedside for tachycardia. Pt denies any chest pain, shortness of breath, dizziness or palpitations. Noted with rectal temp of 100. 12 lead EKG: Sinus tach @143 bpm.   -Normal saline 500 ml bolus x 1  -Restart home dose of atenolol Pt seen and examined at bedside for tachycardia. Pt denies any chest pain, shortness of breath, dizziness or palpitations. Noted with rectal temp of 100. 12 lead EKG: Sinus tach @143 bpm.   -Normal saline 500 ml bolus x 1  -Tylenol Pt seen and examined at bedside for tachycardia. Pt denies any chest pain, shortness of breath, dizziness or palpitations. Noted with rectal temp of 100. 12 lead EKG: Sinus tach @143 bpm.   -Normal saline 500 ml bolus x 1  -Tylenol  -Restart home atenolol Pt seen and examined at bedside for tachycardia. Pt denies any chest pain, shortness of breath, dizziness or palpitations. Noted with rectal temp of 100. 12 lead EKG: Sinus tach @143 bpm.   -Normal saline 500 ml bolus x 1  -Tylenol  -Duoneb changed to Xopenox  -Restart home atenolol

## 2018-12-12 NOTE — PROVIDER CONTACT NOTE (OTHER) - ACTION/TREATMENT ORDERED:
Will check the chart
Continue to monitor.
ekg; reassess T rectally; 500 ml bolus
1 dose of hydralazine will be ordered
NP to assess patient.
New order: Atenolol 25mg PO daily.
Will continue to monitor.

## 2018-12-12 NOTE — CHART NOTE - NSCHARTNOTEFT_GEN_A_CORE
Pt noted with one episode of vomiting small amount. Denies abdominal pain, no tenderness noted. Pt placed on NPO.

## 2018-12-12 NOTE — PROGRESS NOTE ADULT - SUBJECTIVE AND OBJECTIVE BOX
Patient is a 89y old  Female who presents with a chief complaint of pain and fever (11 Dec 2018 12:56)      SUBJECTIVE / OVERNIGHT EVENTS:  Pt is a poor historian. translated by son at bedside. She felt nausea this morning, reports doesn't feel well "whole body", but can't provide any specific information. Pt is awake, alert, in no distress. Abd noted to be distended.      MEDICATIONS  (STANDING):  amLODIPine   Tablet 5 milliGRAM(s) Oral daily  cefTRIAXone   IVPB 1 Gram(s) IV Intermittent every 24 hours  enoxaparin Injectable 30 milliGRAM(s) SubCutaneous every 24 hours  ipratropium    for Nebulization 500 MICROGram(s) Nebulizer every 4 hours  levalbuterol Inhalation 0.63 milliGRAM(s) Inhalation every 4 hours  tiotropium 18 MICROgram(s) Capsule 1 Capsule(s) Inhalation daily    MEDICATIONS  (PRN):      T(C): 37.4 (12-12-18 @ 08:31), Max: 37.8 (12-12-18 @ 05:52)  HR: 100 (12-12-18 @ 09:20) (100 - 141)  BP: 140/80 (12-12-18 @ 08:31) (138/78 - 156/83)  RR: 18 (12-12-18 @ 08:31) (16 - 18)  SpO2: 97% (12-12-18 @ 09:20) (95% - 100%)  CAPILLARY BLOOD GLUCOSE        I&O's Summary    11 Dec 2018 07:01  -  12 Dec 2018 07:00  --------------------------------------------------------  IN: 400 mL / OUT: 0 mL / NET: 400 mL        PHYSICAL EXAM  GENERAL: Elderly fail woman in bed, appears comfortable  HEAD:  Atraumatic, Normocephalic  EYES: EOMI, PERRLA, conjunctiva and sclera clear  NECK: Supple,   CHEST/LUNG: nonlabored in breathing, no wheezing    HEART: Regular rate and rhythm  ABDOMEN: Nontender, distended; Bowel sounds present  EXTREMITIES:  2+ Peripheral Pulses, No clubbing, cyanosis, or edema  PSYCH: awake, alert, pleasant     LABS:                        11.0   11.20 )-----------( 294      ( 11 Dec 2018 07:20 )             33.3     12-11    132<L>  |  94<L>  |  12  ----------------------------<  128<H>  4.0   |  28  |  0.60    Ca    9.4      11 Dec 2018 07:20  Phos  3.0     12-11  Mg     1.9     12-11                RADIOLOGY & ADDITIONAL TESTS:    Imaging Personally Reviewed:    Consultant(s) Notes Reviewed:      Care Discussed with Consultants/Other Providers: Dr. Montoya regarding abx Patient is a 89y old  Female who presents with a chief complaint of pain and fever (11 Dec 2018 12:56)      SUBJECTIVE / OVERNIGHT EVENTS:  Pt is a poor historian. translated by son at bedside. She felt nausea this morning, reports doesn't feel well "whole body", but can't provide any specific information. Pt is awake, alert, in no distress. Abd noted to be distended.  + fecal incontinence yesterday per RN    MEDICATIONS  (STANDING):  amLODIPine   Tablet 5 milliGRAM(s) Oral daily  cefTRIAXone   IVPB 1 Gram(s) IV Intermittent every 24 hours  enoxaparin Injectable 30 milliGRAM(s) SubCutaneous every 24 hours  ipratropium    for Nebulization 500 MICROGram(s) Nebulizer every 4 hours  levalbuterol Inhalation 0.63 milliGRAM(s) Inhalation every 4 hours  tiotropium 18 MICROgram(s) Capsule 1 Capsule(s) Inhalation daily    MEDICATIONS  (PRN):      T(C): 37.4 (12-12-18 @ 08:31), Max: 37.8 (12-12-18 @ 05:52)  HR: 100 (12-12-18 @ 09:20) (100 - 141)  BP: 140/80 (12-12-18 @ 08:31) (138/78 - 156/83)  RR: 18 (12-12-18 @ 08:31) (16 - 18)  SpO2: 97% (12-12-18 @ 09:20) (95% - 100%)  CAPILLARY BLOOD GLUCOSE        I&O's Summary    11 Dec 2018 07:01  -  12 Dec 2018 07:00  --------------------------------------------------------  IN: 400 mL / OUT: 0 mL / NET: 400 mL        PHYSICAL EXAM  GENERAL: Elderly fail woman in bed, appears comfortable  HEAD:  Atraumatic, Normocephalic  EYES: EOMI, PERRLA, conjunctiva and sclera clear  NECK: Supple,   CHEST/LUNG: nonlabored in breathing, no wheezing    HEART: Regular rate and rhythm  ABDOMEN: Nontender, distended; Bowel sounds present  EXTREMITIES:  2+ Peripheral Pulses, No clubbing, cyanosis, or edema  PSYCH: awake, alert, pleasant     LABS:                        11.0   11.20 )-----------( 294      ( 11 Dec 2018 07:20 )             33.3     12-11    132<L>  |  94<L>  |  12  ----------------------------<  128<H>  4.0   |  28  |  0.60    Ca    9.4      11 Dec 2018 07:20  Phos  3.0     12-11  Mg     1.9     12-11                RADIOLOGY & ADDITIONAL TESTS:    Imaging Personally Reviewed:    Consultant(s) Notes Reviewed:      Care Discussed with Consultants/Other Providers: Dr. Montoya regarding abx

## 2018-12-12 NOTE — PROVIDER CONTACT NOTE (OTHER) - SITUATION
Pt's urine cx came back negative and the contact isolation orders should be discontinued.  I spoke with Ms. Dobson on 3 separate occasions but she still hasn't discontinued the orders
 /84 RR 17 95% RA; T 98.4. pt denies any s/s of acute distress at this time
/88
Patient BP manually 180/86, HR 82
Patient has cough and spit out blood colored mucous.
Patient's heart rate is 107
Pt bp 198/114

## 2018-12-12 NOTE — PROGRESS NOTE ADULT - ATTENDING COMMENTS
Jake Montoya  Attending Physician   Division of Infectious Disease  Pager #[phone]  After 5pm/weekend or no response, call #[phone]

## 2018-12-12 NOTE — PROVIDER CONTACT NOTE (OTHER) - ASSESSMENT
Pt is asymptomatic
 /84 RR 17 95% RA; T 98.4
Patient denies having chest pain
Pt bp 198/114 . Pt daughter stated she normally takes Atenolol 25mg po daily.
Pt is alert and oriented x2, lethargic.
Spit out blood mixed with mucous
patient appears to be in no distress, no s/s of pain noted.

## 2018-12-12 NOTE — PROVIDER CONTACT NOTE (OTHER) - DATE AND TIME:
07-Dec-2018 14:43
08-Dec-2018 15:50
08-Dec-2018 22:26
10-Dec-2018 04:23
10-Dec-2018 22:12
12-Dec-2018 05:30
11-Dec-2018 04:00

## 2018-12-12 NOTE — CHART NOTE - NSCHARTNOTEFT_GEN_A_CORE
Pt with distended stomach. Spoke with Radiologist regarding pre-apodaca abdominal X-ray read, similar to previous CT scan reading, no obvious obstruction noted but possibly ileus. Spoke with attending, will try clear liquid diet and monitor.     ADS  [id number]

## 2018-12-13 LAB
BUN SERPL-MCNC: 24 MG/DL — HIGH (ref 7–23)
CALCIUM SERPL-MCNC: 8.9 MG/DL — SIGNIFICANT CHANGE UP (ref 8.4–10.5)
CHLORIDE SERPL-SCNC: 101 MMOL/L — SIGNIFICANT CHANGE UP (ref 98–107)
CO2 SERPL-SCNC: 26 MMOL/L — SIGNIFICANT CHANGE UP (ref 22–31)
CREAT SERPL-MCNC: 0.5 MG/DL — SIGNIFICANT CHANGE UP (ref 0.5–1.3)
GLUCOSE SERPL-MCNC: 124 MG/DL — HIGH (ref 70–99)
HCT VFR BLD CALC: 31.6 % — LOW (ref 34.5–45)
HGB BLD-MCNC: 10.2 G/DL — LOW (ref 11.5–15.5)
LACTATE SERPL-SCNC: 1.1 MMOL/L — SIGNIFICANT CHANGE UP (ref 0.5–2)
MCHC RBC-ENTMCNC: 21.7 PG — LOW (ref 27–34)
MCHC RBC-ENTMCNC: 32.3 % — SIGNIFICANT CHANGE UP (ref 32–36)
MCV RBC AUTO: 67.1 FL — LOW (ref 80–100)
NRBC # FLD: 0 — SIGNIFICANT CHANGE UP
PLATELET # BLD AUTO: 357 K/UL — SIGNIFICANT CHANGE UP (ref 150–400)
PMV BLD: 10.2 FL — SIGNIFICANT CHANGE UP (ref 7–13)
POTASSIUM SERPL-MCNC: 3.7 MMOL/L — SIGNIFICANT CHANGE UP (ref 3.5–5.3)
POTASSIUM SERPL-SCNC: 3.7 MMOL/L — SIGNIFICANT CHANGE UP (ref 3.5–5.3)
RBC # BLD: 4.71 M/UL — SIGNIFICANT CHANGE UP (ref 3.8–5.2)
RBC # FLD: 14.3 % — SIGNIFICANT CHANGE UP (ref 10.3–14.5)
SODIUM SERPL-SCNC: 136 MMOL/L — SIGNIFICANT CHANGE UP (ref 135–145)
SPECIMEN SOURCE: SIGNIFICANT CHANGE UP
SPECIMEN SOURCE: SIGNIFICANT CHANGE UP
WBC # BLD: 13.43 K/UL — HIGH (ref 3.8–10.5)
WBC # FLD AUTO: 13.43 K/UL — HIGH (ref 3.8–10.5)

## 2018-12-13 PROCEDURE — 99232 SBSQ HOSP IP/OBS MODERATE 35: CPT

## 2018-12-13 PROCEDURE — 99233 SBSQ HOSP IP/OBS HIGH 50: CPT

## 2018-12-13 RX ORDER — IPRATROPIUM/ALBUTEROL SULFATE 18-103MCG
3 AEROSOL WITH ADAPTER (GRAM) INHALATION EVERY 4 HOURS
Qty: 0 | Refills: 0 | Status: DISCONTINUED | OUTPATIENT
Start: 2018-12-13 | End: 2018-12-17

## 2018-12-13 RX ORDER — ERTAPENEM SODIUM 1 G/1
1000 INJECTION, POWDER, LYOPHILIZED, FOR SOLUTION INTRAMUSCULAR; INTRAVENOUS EVERY 24 HOURS
Qty: 0 | Refills: 0 | Status: DISCONTINUED | OUTPATIENT
Start: 2018-12-13 | End: 2018-12-17

## 2018-12-13 RX ADMIN — LEVALBUTEROL 0.63 MILLIGRAM(S): 1.25 SOLUTION, CONCENTRATE RESPIRATORY (INHALATION) at 12:48

## 2018-12-13 RX ADMIN — LEVALBUTEROL 0.63 MILLIGRAM(S): 1.25 SOLUTION, CONCENTRATE RESPIRATORY (INHALATION) at 09:02

## 2018-12-13 RX ADMIN — AMLODIPINE BESYLATE 5 MILLIGRAM(S): 2.5 TABLET ORAL at 06:18

## 2018-12-13 RX ADMIN — Medication 500 MICROGRAM(S): at 05:10

## 2018-12-13 RX ADMIN — ATENOLOL 25 MILLIGRAM(S): 25 TABLET ORAL at 06:18

## 2018-12-13 RX ADMIN — Medication 100 MILLIGRAM(S): at 19:01

## 2018-12-13 RX ADMIN — ERTAPENEM SODIUM 120 MILLIGRAM(S): 1 INJECTION, POWDER, LYOPHILIZED, FOR SOLUTION INTRAMUSCULAR; INTRAVENOUS at 18:59

## 2018-12-13 RX ADMIN — CEFTRIAXONE 100 GRAM(S): 500 INJECTION, POWDER, FOR SOLUTION INTRAMUSCULAR; INTRAVENOUS at 12:35

## 2018-12-13 RX ADMIN — Medication 500 MICROGRAM(S): at 01:01

## 2018-12-13 RX ADMIN — SENNA PLUS 2 TABLET(S): 8.6 TABLET ORAL at 22:12

## 2018-12-13 RX ADMIN — LEVALBUTEROL 0.63 MILLIGRAM(S): 1.25 SOLUTION, CONCENTRATE RESPIRATORY (INHALATION) at 01:01

## 2018-12-13 RX ADMIN — Medication 10 MILLIGRAM(S): at 14:41

## 2018-12-13 RX ADMIN — LEVALBUTEROL 0.63 MILLIGRAM(S): 1.25 SOLUTION, CONCENTRATE RESPIRATORY (INHALATION) at 05:10

## 2018-12-13 RX ADMIN — Medication 500 MICROGRAM(S): at 09:10

## 2018-12-13 RX ADMIN — Medication 500 MICROGRAM(S): at 12:49

## 2018-12-13 RX ADMIN — ENOXAPARIN SODIUM 30 MILLIGRAM(S): 100 INJECTION SUBCUTANEOUS at 18:59

## 2018-12-13 RX ADMIN — Medication 100 MILLIGRAM(S): at 06:18

## 2018-12-13 NOTE — PROGRESS NOTE ADULT - ATTENDING COMMENTS
Jake Montoya  Attending Physician   Division of Infectious Disease  Pager #[phone]  After 5pm/weekend or no response, call #[phone]

## 2018-12-13 NOTE — PROGRESS NOTE ADULT - PROBLEM SELECTOR PLAN 3
TTE with normal LVSF, no documented diastolic dysfunction  CXR clear, RVP neg x2; possible reactive airway disease and/or atelectasis   CT chest with IV contrast shows Small bilateral pleural effusions with passive atelectasis of the bilateral lower lobes.  Conservative management for now, nebs q6h standing during day and prn at night , chest PT   OOB to chair when possible  Pulmonary  recommendations appreciated TTE with normal LVSF, no documented diastolic dysfunction  CXR clear, RVP neg x2; possible reactive airway disease and/or atelectasis   CT chest with IV contrast shows Small bilateral pleural effusions with passive atelectasis of the bilateral lower lobes.  Conservative management for now,  will change nebs to PRN as pt not wheezing   chest PT   OOB to chair when possible

## 2018-12-13 NOTE — PROGRESS NOTE ADULT - SUBJECTIVE AND OBJECTIVE BOX
ARASH SANDOVAL 89y [id number]    Patient is a 89y old  Female who presents with a chief complaint of pain and fever (13 Dec 2018 13:00)      Follow Up/CC:  ID following for fever    Interval History/ROS: low grade temp yesterday    Allergies    No Known Allergies    Intolerances        ANTIMICROBIALS:  ertapenem  IVPB 1000 every 24 hours      MEDICATIONS  (STANDING):  amLODIPine   Tablet 5 milliGRAM(s) Oral daily  ATENolol  Tablet 25 milliGRAM(s) Oral daily  dextrose 5% + sodium chloride 0.9%. 1000 milliLiter(s) (50 mL/Hr) IV Continuous <Continuous>  docusate sodium 100 milliGRAM(s) Oral two times a day  enoxaparin Injectable 30 milliGRAM(s) SubCutaneous every 24 hours  ertapenem  IVPB 1000 milliGRAM(s) IV Intermittent every 24 hours  metoclopramide Injectable 5 milliGRAM(s) IV Push once  senna 2 Tablet(s) Oral at bedtime  tiotropium 18 MICROgram(s) Capsule 1 Capsule(s) Inhalation daily    MEDICATIONS  (PRN):  ALBUTerol/ipratropium for Nebulization 3 milliLiter(s) Nebulizer every 4 hours PRN sob/wheezing        Vital Signs Last 24 Hrs  T(C): 36.9 (13 Dec 2018 14:13), Max: 36.9 (13 Dec 2018 14:13)  T(F): 98.4 (13 Dec 2018 14:13), Max: 98.4 (13 Dec 2018 14:13)  HR: 86 (13 Dec 2018 14:13) (86 - 100)  BP: 153/74 (13 Dec 2018 14:13) (143/85 - 153/74)  BP(mean): --  RR: 18 (13 Dec 2018 14:13) (18 - 18)  SpO2: 96% (13 Dec 2018 14:13) (95% - 98%)    CBC Full  -  ( 13 Dec 2018 06:11 )  WBC Count : 13.43 K/uL  Hemoglobin : 10.2 g/dL  Hematocrit : 31.6 %  Platelet Count - Automated : 357 K/uL  Mean Cell Volume : 67.1 fL  Mean Cell Hemoglobin : 21.7 pg  Mean Cell Hemoglobin Concentration : 32.3 %  Auto Neutrophil # : x  Auto Lymphocyte # : x  Auto Monocyte # : x  Auto Eosinophil # : x  Auto Basophil # : x  Auto Neutrophil % : x  Auto Lymphocyte % : x  Auto Monocyte % : x  Auto Eosinophil % : x  Auto Basophil % : x    12-    136  |  101  |  24<H>  ----------------------------<  124<H>  3.7   |  26  |  0.50    Ca    8.9      13 Dec 2018 06:11        Urinalysis Basic - ( 12 Dec 2018 17:26 )    Color: LIGHT YELLOW / Appearance: CLEAR / S.018 / pH: 7.5  Gluc: NEGATIVE / Ketone: NEGATIVE  / Bili: NEGATIVE / Urobili: NORMAL   Blood: NEGATIVE / Protein: 20 / Nitrite: NEGATIVE   Leuk Esterase: TRACE / RBC: 0-2 / WBC 6-10   Sq Epi: FEW / Non Sq Epi: x / Bacteria: NEGATIVE        MICROBIOLOGY:  BLOOD  18 --  --  --      BLOOD  18 --  --  --      URINE CATHETER  18 --  --  Escherichia coli      BLOOD PERIPHERAL  18 --  --  BLOOD CULTURE PCR  Escherichia coli      RADIOLOGY    < from: Xray Chest 1 View AP/PA (18 @ 14:50) >  Distended air-filled esophagus and small hiatus hernia.   Prominent gas-filled bowel loops seen in the abdomen. Please see report   ofabdominal x-rays obtained at the same time for further detail.    Hazy opacity at the left base, likely a small pleural effusion with   passive atelectasis seen on the CT scan, although infection is not   excluded.

## 2018-12-13 NOTE — PROGRESS NOTE ADULT - PROBLEM SELECTOR PLAN 1
No urine retention per bladder scan   etiology unclear   check abd xray No urine retention per bladder scan   etiology unclear    abd xray noted as above, pt had BM after suppository, will start clear liquid diet  Distended air-filled esophagus and small hiatus hernia. ,Prominent gas-filled bowel loops seen in the abdomen seen on CXR, will request GI eval

## 2018-12-13 NOTE — PROGRESS NOTE ADULT - PROBLEM SELECTOR PLAN 3
-low grade temp+  -?aspiration given abd distension, ileus, vomiting  -DC ceftriaxone  -invanz 1 gm iv q24

## 2018-12-13 NOTE — PROGRESS NOTE ADULT - SUBJECTIVE AND OBJECTIVE BOX
Patient is a 89y old  Female who presents with a chief complaint of pain and fever (12 Dec 2018 12:15)      SUBJECTIVE / OVERNIGHT EVENTS:    MEDICATIONS  (STANDING):  amLODIPine   Tablet 5 milliGRAM(s) Oral daily  ATENolol  Tablet 25 milliGRAM(s) Oral daily  cefTRIAXone   IVPB 1 Gram(s) IV Intermittent every 24 hours  dextrose 5% + sodium chloride 0.9%. 1000 milliLiter(s) (50 mL/Hr) IV Continuous <Continuous>  docusate sodium 100 milliGRAM(s) Oral two times a day  enoxaparin Injectable 30 milliGRAM(s) SubCutaneous every 24 hours  ipratropium    for Nebulization 500 MICROGram(s) Nebulizer every 4 hours  levalbuterol Inhalation 0.63 milliGRAM(s) Inhalation every 4 hours  metoclopramide Injectable 5 milliGRAM(s) IV Push once  senna 2 Tablet(s) Oral at bedtime  tiotropium 18 MICROgram(s) Capsule 1 Capsule(s) Inhalation daily    MEDICATIONS  (PRN):      Vital Signs Last 24 Hrs  T(C): 36.6 (13 Dec 2018 06:17), Max: 36.8 (12 Dec 2018 20:45)  T(F): 97.9 (13 Dec 2018 06:17), Max: 98.2 (12 Dec 2018 20:45)  HR: 97 (13 Dec 2018 12:49) (92 - 100)  BP: 143/85 (13 Dec 2018 06:17) (143/85 - 145/86)  BP(mean): --  RR: 18 (13 Dec 2018 06:17) (18 - 18)  SpO2: 96% (13 Dec 2018 12:49) (95% - 98%)  CAPILLARY BLOOD GLUCOSE        I&O's Summary    12 Dec 2018 07:01  -  13 Dec 2018 07:00  --------------------------------------------------------  IN: 5 mL / OUT: 0 mL / NET: 5 mL        PHYSICAL EXAM:  GENERAL: NAD, well-developed  HEAD:  Atraumatic, Normocephalic  EYES: EOMI, PERRLA, conjunctiva and sclera clear  NECK: Supple, No JVD  CHEST/LUNG: Clear to auscultation bilaterally; No wheeze  HEART: Regular rate and rhythm; No murmurs, rubs, or gallops  ABDOMEN: Soft, Nontender, Nondistended; Bowel sounds present  EXTREMITIES:  2+ Peripheral Pulses, No clubbing, cyanosis, or edema  PSYCH: AAOx3  NEUROLOGY: non-focal  SKIN: No rashes or lesions    LABS:                        10.2   13.43 )-----------( 357      ( 13 Dec 2018 06:11 )             31.6         136  |  101  |  24<H>  ----------------------------<  124<H>  3.7   |  26  |  0.50    Ca    8.9      13 Dec 2018 06:11            Urinalysis Basic - ( 12 Dec 2018 17:26 )    Color: LIGHT YELLOW / Appearance: CLEAR / S.018 / pH: 7.5  Gluc: NEGATIVE / Ketone: NEGATIVE  / Bili: NEGATIVE / Urobili: NORMAL   Blood: NEGATIVE / Protein: 20 / Nitrite: NEGATIVE   Leuk Esterase: TRACE / RBC: 0-2 / WBC 6-10   Sq Epi: FEW / Non Sq Epi: x / Bacteria: NEGATIVE        RADIOLOGY & ADDITIONAL TESTS:    Imaging Personally Reviewed:    Consultant(s) Notes Reviewed:      Care Discussed with Consultants/Other Providers: Patient is a 89y old  Female who presents with a chief complaint of pain and fever (12 Dec 2018 12:15)      SUBJECTIVE / OVERNIGHT EVENTS: patient seen and examined by bedside at (:35 AM, pt awake, alert, afebrile, no apparent distress noted . Pt has two episodes of vomiting yesterday ,       MEDICATIONS  (STANDING):  amLODIPine   Tablet 5 milliGRAM(s) Oral daily  ATENolol  Tablet 25 milliGRAM(s) Oral daily  cefTRIAXone   IVPB 1 Gram(s) IV Intermittent every 24 hours  dextrose 5% + sodium chloride 0.9%. 1000 milliLiter(s) (50 mL/Hr) IV Continuous <Continuous>  docusate sodium 100 milliGRAM(s) Oral two times a day  enoxaparin Injectable 30 milliGRAM(s) SubCutaneous every 24 hours  ipratropium    for Nebulization 500 MICROGram(s) Nebulizer every 4 hours  levalbuterol Inhalation 0.63 milliGRAM(s) Inhalation every 4 hours  metoclopramide Injectable 5 milliGRAM(s) IV Push once  senna 2 Tablet(s) Oral at bedtime  tiotropium 18 MICROgram(s) Capsule 1 Capsule(s) Inhalation daily    MEDICATIONS  (PRN):      Vital Signs Last 24 Hrs  T(C): 36.6 (13 Dec 2018 06:17), Max: 36.8 (12 Dec 2018 20:45)  T(F): 97.9 (13 Dec 2018 06:17), Max: 98.2 (12 Dec 2018 20:45)  HR: 97 (13 Dec 2018 12:49) (92 - 100)  BP: 143/85 (13 Dec 2018 06:17) (143/85 - 145/86)  BP(mean): --  RR: 18 (13 Dec 2018 06:17) (18 - 18)  SpO2: 96% (13 Dec 2018 12:49) (95% - 98%)  CAPILLARY BLOOD GLUCOSE        I&O's Summary    12 Dec 2018 07:01  -  13 Dec 2018 07:00  --------------------------------------------------------  IN: 5 mL / OUT: 0 mL / NET: 5 mL        PHYSICAL EXAM  GENERAL: Elderly fail woman in bed, appears comfortable  HEAD:  Atraumatic, Normocephalic  EYES: EOMI, PERRLA, conjunctiva and sclera clear  NECK: Supple,   CHEST/LUNG: nonlabored in breathing, no wheezing    HEART: Regular rate and rhythm  ABDOMEN: Nontender, non distended; Bowel sounds hypoactive   EXTREMITIES:  2+ Peripheral Pulses, No clubbing, cyanosis, or edema  PSYCH: awake, alert, pleasant         LABS:                        10.2   13.43 )-----------( 357      ( 13 Dec 2018 06:11 )             31.6         136  |  101  |  24<H>  ----------------------------<  124<H>  3.7   |  26  |  0.50    Ca    8.9      13 Dec 2018 06:11            Urinalysis Basic - ( 12 Dec 2018 17:26 )    Color: LIGHT YELLOW / Appearance: CLEAR / S.018 / pH: 7.5  Gluc: NEGATIVE / Ketone: NEGATIVE  / Bili: NEGATIVE / Urobili: NORMAL   Blood: NEGATIVE / Protein: 20 / Nitrite: NEGATIVE   Leuk Esterase: TRACE / RBC: 0-2 / WBC 6-10   Sq Epi: FEW / Non Sq Epi: x / Bacteria: NEGATIVE        RADIOLOGY & ADDITIONAL TESTS:    < from: Xray Abdomen 2 Views (18 @ 14:49) >  FINDINGS:  Prominent air-filled loops of bowel similar in appearance to CT abdomen   and pelvis from 12/10/2018. Nonspecific bowel gas pattern.   There is no evidence of intraperitoneal free air.  Degenerative osseous changes.    IMPRESSION:   Nonspecific bowel gas pattern similar in appearance to prior CT without   evidence of free air.       < end of copied text >  < from: Xray Chest 1 View AP/PA (18 @ 14:50) >  IMPRESSION:  Distended air-filled esophagus and small hiatus hernia.   Prominent gas-filled bowel loops seen in the abdomen. Please see report   ofabdominal x-rays obtained at the same time for further detail.    Hazy opacity at the left base, likely a small pleural effusion with   passive atelectasis seen on the CT scan, although infection is not   excluded.      < end of copied text >    Consultant(s) Notes Reviewed:      Care Discussed with Consultants/Other Providers:

## 2018-12-13 NOTE — PROGRESS NOTE ADULT - PROBLEM SELECTOR PLAN 2
Sepsis 2/2 E. Coli bacteremia likely from UTI  Blood and urine cx sensitivities inconsistent, both E. coli; repeat bcx 12/7 NTD  c/w  IV ceftriaxone 1 gm q24  CT abdo/pelvis with po/IV contrast to r/o GI source was unremarkable   ID following, recommendations appreciated Sepsis 2/2 E. Coli bacteremia likely from UTI  Blood and urine cx sensitivities inconsistent, both E. coli; repeat bcx 12/7 NTD  CT abdo/pelvis with po/IV contrast to r/o GI source was unremarkable   on IV ceftriaxone 1 gm q24, case d/w ID, recommend changing to invanz for possible aspiration   ID following, recommendations appreciated

## 2018-12-14 LAB
BASOPHILS # BLD AUTO: 0.04 K/UL — SIGNIFICANT CHANGE UP (ref 0–0.2)
BASOPHILS NFR BLD AUTO: 0.3 % — SIGNIFICANT CHANGE UP (ref 0–2)
BUN SERPL-MCNC: 17 MG/DL — SIGNIFICANT CHANGE UP (ref 7–23)
CALCIUM SERPL-MCNC: 8.9 MG/DL — SIGNIFICANT CHANGE UP (ref 8.4–10.5)
CHLORIDE SERPL-SCNC: 100 MMOL/L — SIGNIFICANT CHANGE UP (ref 98–107)
CO2 SERPL-SCNC: 25 MMOL/L — SIGNIFICANT CHANGE UP (ref 22–31)
CREAT SERPL-MCNC: 0.48 MG/DL — LOW (ref 0.5–1.3)
EOSINOPHIL # BLD AUTO: 0.21 K/UL — SIGNIFICANT CHANGE UP (ref 0–0.5)
EOSINOPHIL NFR BLD AUTO: 1.7 % — SIGNIFICANT CHANGE UP (ref 0–6)
GLUCOSE SERPL-MCNC: 82 MG/DL — SIGNIFICANT CHANGE UP (ref 70–99)
HCT VFR BLD CALC: 30.2 % — LOW (ref 34.5–45)
HGB BLD-MCNC: 9.5 G/DL — LOW (ref 11.5–15.5)
IMM GRANULOCYTES # BLD AUTO: 0.11 # — SIGNIFICANT CHANGE UP
IMM GRANULOCYTES NFR BLD AUTO: 0.9 % — SIGNIFICANT CHANGE UP (ref 0–1.5)
LYMPHOCYTES # BLD AUTO: 1.14 K/UL — SIGNIFICANT CHANGE UP (ref 1–3.3)
LYMPHOCYTES # BLD AUTO: 9.2 % — LOW (ref 13–44)
MAGNESIUM SERPL-MCNC: 1.9 MG/DL — SIGNIFICANT CHANGE UP (ref 1.6–2.6)
MCHC RBC-ENTMCNC: 21.9 PG — LOW (ref 27–34)
MCHC RBC-ENTMCNC: 31.5 % — LOW (ref 32–36)
MCV RBC AUTO: 69.7 FL — LOW (ref 80–100)
MONOCYTES # BLD AUTO: 0.78 K/UL — SIGNIFICANT CHANGE UP (ref 0–0.9)
MONOCYTES NFR BLD AUTO: 6.3 % — SIGNIFICANT CHANGE UP (ref 2–14)
NEUTROPHILS # BLD AUTO: 10.12 K/UL — HIGH (ref 1.8–7.4)
NEUTROPHILS NFR BLD AUTO: 81.6 % — HIGH (ref 43–77)
NRBC # FLD: 0 — SIGNIFICANT CHANGE UP
PHOSPHATE SERPL-MCNC: 2.8 MG/DL — SIGNIFICANT CHANGE UP (ref 2.5–4.5)
PLATELET # BLD AUTO: 431 K/UL — HIGH (ref 150–400)
PMV BLD: 10.1 FL — SIGNIFICANT CHANGE UP (ref 7–13)
POTASSIUM SERPL-MCNC: 4.3 MMOL/L — SIGNIFICANT CHANGE UP (ref 3.5–5.3)
POTASSIUM SERPL-SCNC: 4.3 MMOL/L — SIGNIFICANT CHANGE UP (ref 3.5–5.3)
RBC # BLD: 4.33 M/UL — SIGNIFICANT CHANGE UP (ref 3.8–5.2)
RBC # FLD: 14.6 % — HIGH (ref 10.3–14.5)
SODIUM SERPL-SCNC: 138 MMOL/L — SIGNIFICANT CHANGE UP (ref 135–145)
WBC # BLD: 12.4 K/UL — HIGH (ref 3.8–10.5)
WBC # FLD AUTO: 12.4 K/UL — HIGH (ref 3.8–10.5)

## 2018-12-14 PROCEDURE — 99222 1ST HOSP IP/OBS MODERATE 55: CPT | Mod: GC

## 2018-12-14 PROCEDURE — 99232 SBSQ HOSP IP/OBS MODERATE 35: CPT

## 2018-12-14 PROCEDURE — 99233 SBSQ HOSP IP/OBS HIGH 50: CPT

## 2018-12-14 RX ADMIN — ATENOLOL 25 MILLIGRAM(S): 25 TABLET ORAL at 05:31

## 2018-12-14 RX ADMIN — ERTAPENEM SODIUM 120 MILLIGRAM(S): 1 INJECTION, POWDER, LYOPHILIZED, FOR SOLUTION INTRAMUSCULAR; INTRAVENOUS at 17:42

## 2018-12-14 RX ADMIN — SENNA PLUS 2 TABLET(S): 8.6 TABLET ORAL at 22:16

## 2018-12-14 RX ADMIN — AMLODIPINE BESYLATE 5 MILLIGRAM(S): 2.5 TABLET ORAL at 05:32

## 2018-12-14 RX ADMIN — ENOXAPARIN SODIUM 30 MILLIGRAM(S): 100 INJECTION SUBCUTANEOUS at 17:42

## 2018-12-14 RX ADMIN — Medication 100 MILLIGRAM(S): at 05:31

## 2018-12-14 NOTE — CONSULT NOTE ADULT - SUBJECTIVE AND OBJECTIVE BOX
Chief Complaint:  Patient is a 89y old  Female who presents with a chief complaint of pain and fever (13 Dec 2018 14:51)      HPI: Pt is a 89 year old female, Chinese speaking who was brought in by family complaining of weakness and fevers since Monday night. Pt was in normal state of health and took a turn around midnight. Pt was diagnosed with E.coli bacteremia and was started on CTX and subsequently switched to Ertapenem yesterday for aspiration PNA. GI was consulted for nausea and vomiting x3 episode per family member at bedside and patient. Pt states that the vomitus was chocolate in color, patient otherwise denies melena, hematochezia, dysphagia, odynophagia, further fever or chills, bloating sensation, weight loss, early satiety. CT abd/pelvis showed no bowel obstruction, however on xray showed distended air-filled esophagus, small hiatus hernia and prominent gas-filled bowel loops seen in the abdomen. Pt endorses some abdominal discomfort, but was unable to further characterize symptoms.     Of note, Hgb has been slowly downtrending since admission (11.9 to 9.5.)      Allergies:  No Known Allergies      Home Medications:    Hospital Medications:  ALBUTerol/ipratropium for Nebulization 3 milliLiter(s) Nebulizer every 4 hours PRN  amLODIPine   Tablet 5 milliGRAM(s) Oral daily  ATENolol  Tablet 25 milliGRAM(s) Oral daily  dextrose 5% + sodium chloride 0.9%. 1000 milliLiter(s) IV Continuous <Continuous>  docusate sodium 100 milliGRAM(s) Oral two times a day  enoxaparin Injectable 30 milliGRAM(s) SubCutaneous every 24 hours  ertapenem  IVPB 1000 milliGRAM(s) IV Intermittent every 24 hours  metoclopramide Injectable 5 milliGRAM(s) IV Push once  senna 2 Tablet(s) Oral at bedtime  tiotropium 18 MICROgram(s) Capsule 1 Capsule(s) Inhalation daily      PMHX/PSHX:  No significant past surgical history      Family history:      There is no family history of peptic ulcer disease, gastric cancer, colon polyps, colon cancer, celiac disease, biliary, hepatic, or pancreatic disease.  None of the female relatives have breast, uterine, or ovarian cancer.     Social History: Pt denies alcohol, cigarette drug use.     ROS:     General:  No wt loss, fevers, chills, night sweats, fatigue,   Eyes:  Good vision, no reported pain  ENT:  No sore throat, pain, runny nose, dysphagia  CV:  No pain, palpitations, hypo/hypertension  Resp:  No dyspnea, cough, tachypnea, wheezing  GI:  See HPI  :  No pain, bleeding, incontinence, nocturia  Muscle:  No pain, weakness  Neuro:  No weakness, tingling, memory problems  Psych:  No fatigue, insomnia, mood problems, depression  Endocrine:  No polyuria, polydipsia, cold/heat intolerance  Heme:  No petechiae, ecchymosis, easy bruisability  Skin:  No rash, tattoos, scars, edema      PHYSICAL EXAM:     GENERAL:  Appears stated age  HEENT:  NC/AT,  conjunctivae clear and pink  CHEST:  Full & symmetric excursion  HEART:  Regular rhythm, S1, S2  ABDOMEN:  Soft, non-tender, non-distended, normoactive bowel sounds  EXTEREMITIES:  no cyanosis,clubbing or edema  SKIN:  No rash  NEURO:  Alert, oriented, no asterixis, no tremor, no encephalopathy    Vital Signs:  Vital Signs Last 24 Hrs  T(C): 36.7 (14 Dec 2018 05:30), Max: 36.9 (13 Dec 2018 14:13)  T(F): 98.1 (14 Dec 2018 05:30), Max: 98.4 (13 Dec 2018 14:13)  HR: 85 (14 Dec 2018 05:30) (85 - 98)  BP: 160/73 (14 Dec 2018 05:30) (145/65 - 160/73)  BP(mean): --  RR: 18 (14 Dec 2018 05:30) (18 - 18)  SpO2: 97% (14 Dec 2018 05:30) (96% - 97%)  Daily     Daily     LABS:                        9.5    12.40 )-----------( 431      ( 14 Dec 2018 05:20 )             30.2     Mean Cell Volume: 69.7 fL (-18 @ 05:20)        138  |  100  |  17  ----------------------------<  82  4.3   |  25  |  0.48<L>    Ca    8.9      14 Dec 2018 05:20  Phos  2.8       Mg     1.9     12-14          Urinalysis Basic - ( 12 Dec 2018 17:26 )    Color: LIGHT YELLOW / Appearance: CLEAR / S.018 / pH: 7.5  Gluc: NEGATIVE / Ketone: NEGATIVE  / Bili: NEGATIVE / Urobili: NORMAL   Blood: NEGATIVE / Protein: 20 / Nitrite: NEGATIVE   Leuk Esterase: TRACE / RBC: 0-2 / WBC 6-10   Sq Epi: FEW / Non Sq Epi: x / Bacteria: NEGATIVE                              9.5    12.40 )-----------( 431      ( 14 Dec 2018 05:20 )             30.2                         10.2   13.43 )-----------( 357      ( 13 Dec 2018 06:11 )             31.6                         10.8   15.56 )-----------( 375      ( 12 Dec 2018 19:52 )             33.8     Imaging:  < from: CT Chest w/ IV Cont (12.10.18 @ 15:08) >  FINDINGS:    CHEST:     LUNGS AND LARGE AIRWAYS: Partial passive atelectasis the bilateral lower   lobes. The airways are unremarkable.  PLEURA: Small bilateral pleural effusions. No pneumothorax.  VESSELS: Atheromatous disease. The main pulmonary arteries normal   caliber. No aortic aneurysm.  HEART: Heartis mildly enlarged. No pericardial effusion. Coronary   arterial calcifications.  MEDIASTINUM AND ZBIGNIEW: No lymphadenopathy. Small hiatal hernia.  CHEST WALL AND LOWER NECK: Within normal limits.    ABDOMEN AND PELVIS:    LIVER: Within normal limits.  BILE DUCTS: Normal caliber.  GALLBLADDER: Within normal limits.  SPLEEN: Within normal limits.  PANCREAS: Within normal limits.  ADRENALS: Within normal limits.  KIDNEYS/URETERS: Within normal limits. No hydronephrosis.    BLADDER: Within normal limits.  REPRODUCTIVE ORGANS: The uterus is within normal limits. No adnexal   masses.    BOWEL: No bowel obstruction. Appendix not visualized. Colonic diverticuli   without evidence of diverticulosis.  PERITONEUM: No ascites.  VESSELS:  Within normal limits.  RETROPERITONEUM: No lymphadenopathy.    ABDOMINAL WALL: Within normal limits.  BONES: Age indeterminate compression deformities of T8, T11, L3, L4, and   L5. Osteopenia.    IMPRESSION:     *  Mild cardiomegaly.  *  Small bilateral pleural effusions with passive atelectasis of the   bilateral lower lobes.  *  No bowel obstruction.  *  Age indeterminate compression deformities of T8, T11, L3, L4, and L5.       < end of copied text > Chief Complaint:  Patient is a 89y old  Female who presents with a chief complaint of pain and fever (13 Dec 2018 14:51)      HPI: Pt is a 89 year old female, Chinese speaking who was brought in by family complaining of weakness and fevers since Monday night. Pt was in normal state of health and took a turn around midnight. Pt was diagnosed with E.coli bacteremia and was started on CTX and subsequently switched to Ertapenem yesterday for aspiration PNA. GI was consulted for nausea and vomiting x3 episode 2 days ago per family member at bedside and patient. Pt states that the vomitus was chocolate in color, patient otherwise denies melena, hematochezia, dysphagia, odynophagia, further fever or chills, bloating sensation, weight loss, early satiety. CT abd/pelvis showed no bowel obstruction, however on xray showed distended air-filled esophagus, small hiatus hernia and prominent gas-filled bowel loops seen in the abdomen. Pt endorses some abdominal discomfort, but was unable to further characterize symptoms.     Of note, Hgb has been slowly downtrending since admission (11.9 to 9.5.)      Allergies:  No Known Allergies      Home Medications:    Hospital Medications:  ALBUTerol/ipratropium for Nebulization 3 milliLiter(s) Nebulizer every 4 hours PRN  amLODIPine   Tablet 5 milliGRAM(s) Oral daily  ATENolol  Tablet 25 milliGRAM(s) Oral daily  dextrose 5% + sodium chloride 0.9%. 1000 milliLiter(s) IV Continuous <Continuous>  docusate sodium 100 milliGRAM(s) Oral two times a day  enoxaparin Injectable 30 milliGRAM(s) SubCutaneous every 24 hours  ertapenem  IVPB 1000 milliGRAM(s) IV Intermittent every 24 hours  metoclopramide Injectable 5 milliGRAM(s) IV Push once  senna 2 Tablet(s) Oral at bedtime  tiotropium 18 MICROgram(s) Capsule 1 Capsule(s) Inhalation daily      PMHX/PSHX:  No significant past surgical history      Family history:      There is no family history of peptic ulcer disease, gastric cancer, colon polyps, colon cancer, celiac disease, biliary, hepatic, or pancreatic disease.  None of the female relatives have breast, uterine, or ovarian cancer.     Social History: Pt denies alcohol, cigarette drug use.     ROS:     General:  No wt loss, fevers, chills, night sweats, fatigue,   Eyes:  Good vision, no reported pain  ENT:  No sore throat, pain, runny nose, dysphagia  CV:  No pain, palpitations, hypo/hypertension  Resp:  No dyspnea, cough, tachypnea, wheezing  GI:  See HPI  :  No pain, bleeding, incontinence, nocturia  Muscle:  No pain, weakness  Neuro:  No weakness, tingling, memory problems  Psych:  No fatigue, insomnia, mood problems, depression  Endocrine:  No polyuria, polydipsia, cold/heat intolerance  Heme:  No petechiae, ecchymosis, easy bruisability  Skin:  No rash, tattoos, scars, edema      PHYSICAL EXAM:     GENERAL:  Appears stated age  HEENT:  NC/AT,  conjunctivae clear and pink  CHEST:  Full & symmetric excursion  HEART:  Regular rhythm, S1, S2  ABDOMEN:  Soft, non-tender, non-distended, normoactive bowel sounds  EXTEREMITIES:  no cyanosis,clubbing or edema  SKIN:  No rash  NEURO:  Alert, oriented, no asterixis, no tremor, no encephalopathy    Vital Signs:  Vital Signs Last 24 Hrs  T(C): 36.7 (14 Dec 2018 05:30), Max: 36.9 (13 Dec 2018 14:13)  T(F): 98.1 (14 Dec 2018 05:30), Max: 98.4 (13 Dec 2018 14:13)  HR: 85 (14 Dec 2018 05:30) (85 - 98)  BP: 160/73 (14 Dec 2018 05:30) (145/65 - 160/73)  BP(mean): --  RR: 18 (14 Dec 2018 05:30) (18 - 18)  SpO2: 97% (14 Dec 2018 05:30) (96% - 97%)  Daily     Daily     LABS:                        9.5    12.40 )-----------( 431      ( 14 Dec 2018 05:20 )             30.2     Mean Cell Volume: 69.7 fL (- @ 05:20)        138  |  100  |  17  ----------------------------<  82  4.3   |  25  |  0.48<L>    Ca    8.9      14 Dec 2018 05:20  Phos  2.8     12-14  Mg     1.9               Urinalysis Basic - ( 12 Dec 2018 17:26 )    Color: LIGHT YELLOW / Appearance: CLEAR / S.018 / pH: 7.5  Gluc: NEGATIVE / Ketone: NEGATIVE  / Bili: NEGATIVE / Urobili: NORMAL   Blood: NEGATIVE / Protein: 20 / Nitrite: NEGATIVE   Leuk Esterase: TRACE / RBC: 0-2 / WBC 6-10   Sq Epi: FEW / Non Sq Epi: x / Bacteria: NEGATIVE                              9.5    12.40 )-----------( 431      ( 14 Dec 2018 05:20 )             30.2                         10.2   13.43 )-----------( 357      ( 13 Dec 2018 06:11 )             31.6                         10.8   15.56 )-----------( 375      ( 12 Dec 2018 19:52 )             33.8     Imaging:  < from: CT Chest w/ IV Cont (12.10.18 @ 15:08) >  FINDINGS:    CHEST:     LUNGS AND LARGE AIRWAYS: Partial passive atelectasis the bilateral lower   lobes. The airways are unremarkable.  PLEURA: Small bilateral pleural effusions. No pneumothorax.  VESSELS: Atheromatous disease. The main pulmonary arteries normal   caliber. No aortic aneurysm.  HEART: Heartis mildly enlarged. No pericardial effusion. Coronary   arterial calcifications.  MEDIASTINUM AND ZBIGNIEW: No lymphadenopathy. Small hiatal hernia.  CHEST WALL AND LOWER NECK: Within normal limits.    ABDOMEN AND PELVIS:    LIVER: Within normal limits.  BILE DUCTS: Normal caliber.  GALLBLADDER: Within normal limits.  SPLEEN: Within normal limits.  PANCREAS: Within normal limits.  ADRENALS: Within normal limits.  KIDNEYS/URETERS: Within normal limits. No hydronephrosis.    BLADDER: Within normal limits.  REPRODUCTIVE ORGANS: The uterus is within normal limits. No adnexal   masses.    BOWEL: No bowel obstruction. Appendix not visualized. Colonic diverticuli   without evidence of diverticulosis.  PERITONEUM: No ascites.  VESSELS:  Within normal limits.  RETROPERITONEUM: No lymphadenopathy.    ABDOMINAL WALL: Within normal limits.  BONES: Age indeterminate compression deformities of T8, T11, L3, L4, and   L5. Osteopenia.    IMPRESSION:     *  Mild cardiomegaly.  *  Small bilateral pleural effusions with passive atelectasis of the   bilateral lower lobes.  *  No bowel obstruction.  *  Age indeterminate compression deformities of T8, T11, L3, L4, and L5.       < end of copied text >

## 2018-12-14 NOTE — PROGRESS NOTE ADULT - PROBLEM SELECTOR PLAN 1
improved No urine retention per bladder scan   etiology unclear    abd xray noted, pt had BM after suppository, on clear liquid diet  Distended air-filled esophagus and small hiatus hernia. ,Prominent gas-filled bowel loops seen in the abdomen seen on CXR,  GI eval requested, will f/u recommendation   leucocytosis trending down

## 2018-12-14 NOTE — DIETITIAN INITIAL EVALUATION ADULT. - PROBLEM SELECTOR PLAN 4
Likely a result of the UTI  Urine sodium and Cr ordered  If does not correct with UTI treatment, will consider renal u/s

## 2018-12-14 NOTE — PROGRESS NOTE ADULT - SUBJECTIVE AND OBJECTIVE BOX
Patient is a 89y old  Female who presents with a chief complaint of pain and fever (14 Dec 2018 12:08)      SUBJECTIVE / OVERNIGHT EVENTS: patient seen and examined by bedside at 10 :15 Am , no acute distress noted, no further nausea and vomiting, pt had two BMS since yesterday after suppository given   Patient alert awake and afebrile         MEDICATIONS  (STANDING):  amLODIPine   Tablet 5 milliGRAM(s) Oral daily  ATENolol  Tablet 25 milliGRAM(s) Oral daily  dextrose 5% + sodium chloride 0.9%. 1000 milliLiter(s) (50 mL/Hr) IV Continuous <Continuous>  docusate sodium 100 milliGRAM(s) Oral two times a day  enoxaparin Injectable 30 milliGRAM(s) SubCutaneous every 24 hours  ertapenem  IVPB 1000 milliGRAM(s) IV Intermittent every 24 hours  metoclopramide Injectable 5 milliGRAM(s) IV Push once  senna 2 Tablet(s) Oral at bedtime  tiotropium 18 MICROgram(s) Capsule 1 Capsule(s) Inhalation daily    MEDICATIONS  (PRN):  ALBUTerol/ipratropium for Nebulization 3 milliLiter(s) Nebulizer every 4 hours PRN sob/wheezing      Vital Signs Last 24 Hrs  T(C): 36.7 (14 Dec 2018 05:30), Max: 36.9 (13 Dec 2018 14:13)  T(F): 98.1 (14 Dec 2018 05:30), Max: 98.4 (13 Dec 2018 14:13)  HR: 85 (14 Dec 2018 05:30) (85 - 86)  BP: 160/73 (14 Dec 2018 05:30) (145/65 - 160/73)  BP(mean): --  RR: 18 (14 Dec 2018 05:30) (18 - 18)  SpO2: 97% (14 Dec 2018 05:30) (96% - 97%)  CAPILLARY BLOOD GLUCOSE        I&O's Summary        PHYSICAL EXAM  GENERAL: Elderly fail woman in bed, appears comfortable  HEAD:  Atraumatic, Normocephalic  EYES: EOMI, PERRLA, conjunctiva and sclera clear  NECK: Supple,   CHEST/LUNG: nonlabored in breathing, no wheezing    HEART: Regular rate and rhythm  ABDOMEN: Nontender, mildly  distended; Bowel sounds hypoactive   EXTREMITIES:  2+ Peripheral Pulses, No clubbing, cyanosis, or edema  PSYCH: awake, alert, pleasant     LABS:                        9.5    12.40 )-----------( 431      ( 14 Dec 2018 05:20 )             30.2     12-14    138  |  100  |  17  ----------------------------<  82  4.3   |  25  |  0.48<L>    Ca    8.9      14 Dec 2018 05:20  Phos  2.8     12-14  Mg     1.9     12-14            Urinalysis Basic - ( 12 Dec 2018 17:26 )    Color: LIGHT YELLOW / Appearance: CLEAR / S.018 / pH: 7.5  Gluc: NEGATIVE / Ketone: NEGATIVE  / Bili: NEGATIVE / Urobili: NORMAL   Blood: NEGATIVE / Protein: 20 / Nitrite: NEGATIVE   Leuk Esterase: TRACE / RBC: 0-2 / WBC 6-10   Sq Epi: FEW / Non Sq Epi: x / Bacteria: NEGATIVE            Consultant(s) Notes Reviewed:      Care Discussed with Consultants/Other Providers:

## 2018-12-14 NOTE — PROGRESS NOTE ADULT - PROBLEM SELECTOR PLAN 3
TTE with normal LVSF, no documented diastolic dysfunction  CXR clear, RVP neg x2; possible reactive airway disease and/or atelectasis   CT chest with IV contrast shows Small bilateral pleural effusions with passive atelectasis of the bilateral lower lobes.  Conservative management for now,  will change nebs to PRN as pt not wheezing   chest PT   OOB to chair when possible

## 2018-12-14 NOTE — PROGRESS NOTE ADULT - SUBJECTIVE AND OBJECTIVE BOX
ARASH SANDOVAL 89y [id number]    Patient is a 89y old  Female who presents with a chief complaint of pain and fever (14 Dec 2018 13:06)      Follow Up/CC:  ID following for bacteremia    Interval History/ROS: Had BM, no fever    Allergies    No Known Allergies    Intolerances        ANTIMICROBIALS:  ertapenem  IVPB 1000 every 24 hours      MEDICATIONS  (STANDING):  amLODIPine   Tablet 5 milliGRAM(s) Oral daily  ATENolol  Tablet 25 milliGRAM(s) Oral daily  dextrose 5% + sodium chloride 0.9%. 1000 milliLiter(s) (50 mL/Hr) IV Continuous <Continuous>  docusate sodium 100 milliGRAM(s) Oral two times a day  enoxaparin Injectable 30 milliGRAM(s) SubCutaneous every 24 hours  ertapenem  IVPB 1000 milliGRAM(s) IV Intermittent every 24 hours  metoclopramide Injectable 5 milliGRAM(s) IV Push once  senna 2 Tablet(s) Oral at bedtime  tiotropium 18 MICROgram(s) Capsule 1 Capsule(s) Inhalation daily    MEDICATIONS  (PRN):  ALBUTerol/ipratropium for Nebulization 3 milliLiter(s) Nebulizer every 4 hours PRN sob/wheezing        Vital Signs Last 24 Hrs  T(C): 36.8 (14 Dec 2018 13:23), Max: 36.8 (14 Dec 2018 13:23)  T(F): 98.3 (14 Dec 2018 13:23), Max: 98.3 (14 Dec 2018 13:23)  HR: 87 (14 Dec 2018 13:23) (85 - 87)  BP: 165/85 (14 Dec 2018 13:23) (145/65 - 165/85)  BP(mean): --  RR: 18 (14 Dec 2018 13:23) (18 - 18)  SpO2: 93% (14 Dec 2018 13:23) (93% - 97%)    CBC Full  -  ( 14 Dec 2018 05:20 )  WBC Count : 12.40 K/uL  Hemoglobin : 9.5 g/dL  Hematocrit : 30.2 %  Platelet Count - Automated : 431 K/uL  Mean Cell Volume : 69.7 fL  Mean Cell Hemoglobin : 21.9 pg  Mean Cell Hemoglobin Concentration : 31.5 %  Auto Neutrophil # : 10.12 K/uL  Auto Lymphocyte # : 1.14 K/uL  Auto Monocyte # : 0.78 K/uL  Auto Eosinophil # : 0.21 K/uL  Auto Basophil # : 0.04 K/uL  Auto Neutrophil % : 81.6 %  Auto Lymphocyte % : 9.2 %  Auto Monocyte % : 6.3 %  Auto Eosinophil % : 1.7 %  Auto Basophil % : 0.3 %        138  |  100  |  17  ----------------------------<  82  4.3   |  25  |  0.48<L>    Ca    8.9      14 Dec 2018 05:20  Phos  2.8       Mg     1.9             Urinalysis Basic - ( 12 Dec 2018 17:26 )    Color: LIGHT YELLOW / Appearance: CLEAR / S.018 / pH: 7.5  Gluc: NEGATIVE / Ketone: NEGATIVE  / Bili: NEGATIVE / Urobili: NORMAL   Blood: NEGATIVE / Protein: 20 / Nitrite: NEGATIVE   Leuk Esterase: TRACE / RBC: 0-2 / WBC 6-10   Sq Epi: FEW / Non Sq Epi: x / Bacteria: NEGATIVE        MICROBIOLOGY:  BLOOD PERIPHERAL  18 --  --  --      BLOOD  18 --  --  --      BLOOD  18 --  --  --      URINE CATHETER  18 --  --  Escherichia coli      BLOOD PERIPHERAL  18 --  --  BLOOD CULTURE PCR  Escherichia coli      RADIOLOGY    Xray Chest 1 View AP/PA (18 @ 14:50) >  Distended air-filled esophagus and small hiatus hernia.   Prominent gas-filled bowel loops seen in the abdomen. Please see report   ofabdominal x-rays obtained at the same time for further detail.    Hazy opacity at the left base, likely a small pleural effusion with   passive atelectasis seen on the CT scan, although infection is not   excluded.

## 2018-12-14 NOTE — CONSULT NOTE ADULT - ASSESSMENT
Impression:  1) Nausea, vomiting- Symptoms have largely resolved. Abdominal xray showed gas-filled loops seen in abdomen, however on our read likely very minimal.   2) Aspiration PNA- On ertapenem  3) Impression:  1) Nausea, vomiting- Symptoms have largely resolved. Abdominal xray showed gas-filled loops seen in abdomen, however on our read likely very minimal.   2) Aspiration PNA- On ertapenem  3) Urinary tract infection  4) Microcytic anemia- Pt currently with no overt GI bleed, differential diagnosis includes iron def anemia, thalassemia     Recommendations:  - Impression:  1) Nausea, vomiting- Symptoms have largely resolved. Abdominal xray showed gas-filled loops seen in abdomen, however on our read likely very minimal.   2) Aspiration PNA- On ertapenem  3) Urinary tract infection  4) Microcytic anemia- Pt currently with no overt GI bleed, differential diagnosis includes iron def anemia (malignancy, telangiectasia, chronic blood loss from peptic ulcer disease), thalassemia     Recommendations:  -Check iron studies including ferritin, retic count, LDH, haptoglobin  -Continue with supportive care, correct electrolytes  -Out of bed to chair, encourage ambulation  -Would not offer endoscopy at this time  -Ensure adequate BM, avoid opioids  -Rest of care per primary team

## 2018-12-14 NOTE — DIETITIAN INITIAL EVALUATION ADULT. - OTHER INFO
Pt. Cantonese speaking...  phone in room, however preference for son to translate.  He states Pt. has generally good appetite at home, however with decreased PO intake over the past ~3-4 days here at hospital.  NKFA, no history of chewing/swallowing issues.  Per chart & discussion with son, concern was expressed over possibility of aspiration.  Thus, suggest swallow evaluation, which RDN verbalized to ADS provider.  Pt. also receiving clear liquids diet, pending GI consult to determine if diet advancement appropriate.  No nausea/vomiting/diarrhea/constipation at this time.   Son is uncertain of Pt.'s usual body weight, however believes Pt. may have gained some weight over past 1 year as she has become less mobile.

## 2018-12-14 NOTE — PROGRESS NOTE ADULT - PROBLEM SELECTOR PLAN 2
Sepsis 2/2 E. Coli bacteremia likely from UTI  Blood and urine cx sensitivities inconsistent, both E. coli; repeat bcx 12/7 NTD  CT abdo/pelvis with po/IV contrast to r/o GI source was unremarkable   on IV ceftriaxone 1 gm q24, case d/w ID, recommend changing to invanz for possible aspiration   ID following, recommendations appreciated  leucocytosis improving

## 2018-12-14 NOTE — CONSULT NOTE ADULT - ATTENDING COMMENTS
Agree with Above    89 year old female found to have ecoli bacteremia who is being treated with antibiotics for whom we were consulted for wheezing. At the time I saw the patient, she had no wheezing on auscultation and in no respiratory distress. Her saturation on RA was 97% (Nasal canula was off). Can continue duonebs for now. If getting CT chest will look over it tomorrow.     thank you for your consult. We will see the patient after her CT chest is done.
As above.    Impression:    #1.  Admit for infection (UTI/aspiration pneumonia)  #2.  Consult for minimal abdominal pain, resolved n/v.  No significant ileus or small bowel obstruction on abd Xray, CT abd/pelvis  #3.  Mild microcytic anemia with dark brown emesis, no melena/hematochezia.    Recommendations:    #1.  Treatment of underlying infections  #2.  Follow CBC, send iron studies.
Pt. seen and examined today. Pt. with hyponatremia, hypokalemia and NEREIDA. Serum sodium improved to 131 today. Serum potassium WNL at 4.0 today. NEREIDA has resolved. Monitor labs.
Jake Montoya  Attending Physician   Division of Infectious Disease  Pager #[phone]  After 5pm/weekend or no response, call #[phone]    Please call the ID service [phone] with questions or concerns over the weekend.

## 2018-12-14 NOTE — PROGRESS NOTE ADULT - ATTENDING COMMENTS
Jake Montoya  Attending Physician   Division of Infectious Disease  Pager #[phone]  After 5pm/weekend or no response, call #[phone]    Please call the ID service [phone] with questions or concerns over the weekend.

## 2018-12-14 NOTE — DIETITIAN INITIAL EVALUATION ADULT. - PERTINENT MEDS FT
MEDICATIONS  (STANDING):  amLODIPine   Tablet 5 milliGRAM(s) Oral daily  ATENolol  Tablet 25 milliGRAM(s) Oral daily  dextrose 5% + sodium chloride 0.9%. 1000 milliLiter(s) (50 mL/Hr) IV Continuous <Continuous>  docusate sodium 100 milliGRAM(s) Oral two times a day  enoxaparin Injectable 30 milliGRAM(s) SubCutaneous every 24 hours  ertapenem  IVPB 1000 milliGRAM(s) IV Intermittent every 24 hours  metoclopramide Injectable 5 milliGRAM(s) IV Push once  senna 2 Tablet(s) Oral at bedtime  tiotropium 18 MICROgram(s) Capsule 1 Capsule(s) Inhalation daily    MEDICATIONS  (PRN):  ALBUTerol/ipratropium for Nebulization 3 milliLiter(s) Nebulizer every 4 hours PRN sob/wheezing

## 2018-12-15 LAB
BUN SERPL-MCNC: 18 MG/DL — SIGNIFICANT CHANGE UP (ref 7–23)
CALCIUM SERPL-MCNC: 8.6 MG/DL — SIGNIFICANT CHANGE UP (ref 8.4–10.5)
CHLORIDE SERPL-SCNC: 98 MMOL/L — SIGNIFICANT CHANGE UP (ref 98–107)
CO2 SERPL-SCNC: 24 MMOL/L — SIGNIFICANT CHANGE UP (ref 22–31)
CREAT SERPL-MCNC: 0.58 MG/DL — SIGNIFICANT CHANGE UP (ref 0.5–1.3)
FERRITIN SERPL-MCNC: 409.9 NG/ML — HIGH (ref 15–150)
FOLATE SERPL-MCNC: 10.7 NG/ML — SIGNIFICANT CHANGE UP (ref 4.7–20)
GLUCOSE BLDC GLUCOMTR-MCNC: 148 MG/DL — HIGH (ref 70–99)
GLUCOSE SERPL-MCNC: 64 MG/DL — LOW (ref 70–99)
HAPTOGLOB SERPL-MCNC: 334 MG/DL — HIGH (ref 34–200)
HCT VFR BLD CALC: 30.2 % — LOW (ref 34.5–45)
HGB BLD-MCNC: 9.7 G/DL — LOW (ref 11.5–15.5)
IRON SATN MFR SERPL: 152 UG/DL — SIGNIFICANT CHANGE UP (ref 140–530)
IRON SATN MFR SERPL: 23 UG/DL — LOW (ref 30–160)
LDH SERPL L TO P-CCNC: 141 U/L — SIGNIFICANT CHANGE UP (ref 135–225)
MCHC RBC-ENTMCNC: 22 PG — LOW (ref 27–34)
MCHC RBC-ENTMCNC: 32.1 % — SIGNIFICANT CHANGE UP (ref 32–36)
MCV RBC AUTO: 68.6 FL — LOW (ref 80–100)
NRBC # FLD: 0 — SIGNIFICANT CHANGE UP
PLATELET # BLD AUTO: 480 K/UL — HIGH (ref 150–400)
PMV BLD: 10.6 FL — SIGNIFICANT CHANGE UP (ref 7–13)
POTASSIUM SERPL-MCNC: 3 MMOL/L — LOW (ref 3.5–5.3)
POTASSIUM SERPL-SCNC: 3 MMOL/L — LOW (ref 3.5–5.3)
RBC # BLD: 4.4 M/UL — SIGNIFICANT CHANGE UP (ref 3.8–5.2)
RBC # FLD: 14.7 % — HIGH (ref 10.3–14.5)
RETICS #: 100 K/UL — SIGNIFICANT CHANGE UP (ref 25–125)
RETICS/RBC NFR: 2.3 % — SIGNIFICANT CHANGE UP (ref 0.5–2.5)
SODIUM SERPL-SCNC: 134 MMOL/L — LOW (ref 135–145)
UIBC SERPL-MCNC: 128.9 UG/DL — SIGNIFICANT CHANGE UP (ref 110–370)
VIT B12 SERPL-MCNC: 1854 PG/ML — HIGH (ref 200–900)
WBC # BLD: 12.24 K/UL — HIGH (ref 3.8–10.5)
WBC # FLD AUTO: 12.24 K/UL — HIGH (ref 3.8–10.5)

## 2018-12-15 PROCEDURE — 99233 SBSQ HOSP IP/OBS HIGH 50: CPT

## 2018-12-15 PROCEDURE — 99232 SBSQ HOSP IP/OBS MODERATE 35: CPT

## 2018-12-15 RX ORDER — POTASSIUM CHLORIDE 20 MEQ
20 PACKET (EA) ORAL ONCE
Qty: 0 | Refills: 0 | Status: COMPLETED | OUTPATIENT
Start: 2018-12-15 | End: 2018-12-15

## 2018-12-15 RX ORDER — POTASSIUM CHLORIDE 20 MEQ
10 PACKET (EA) ORAL
Qty: 0 | Refills: 0 | Status: COMPLETED | OUTPATIENT
Start: 2018-12-15 | End: 2018-12-15

## 2018-12-15 RX ORDER — FERROUS SULFATE 325(65) MG
325 TABLET ORAL DAILY
Qty: 0 | Refills: 0 | Status: DISCONTINUED | OUTPATIENT
Start: 2018-12-15 | End: 2018-12-17

## 2018-12-15 RX ADMIN — Medication 100 MILLIEQUIVALENT(S): at 13:46

## 2018-12-15 RX ADMIN — AMLODIPINE BESYLATE 5 MILLIGRAM(S): 2.5 TABLET ORAL at 05:06

## 2018-12-15 RX ADMIN — ATENOLOL 25 MILLIGRAM(S): 25 TABLET ORAL at 05:06

## 2018-12-15 RX ADMIN — Medication 100 MILLIEQUIVALENT(S): at 12:23

## 2018-12-15 RX ADMIN — Medication 100 MILLIEQUIVALENT(S): at 10:36

## 2018-12-15 RX ADMIN — ENOXAPARIN SODIUM 30 MILLIGRAM(S): 100 INJECTION SUBCUTANEOUS at 18:07

## 2018-12-15 RX ADMIN — Medication 325 MILLIGRAM(S): at 18:07

## 2018-12-15 RX ADMIN — SENNA PLUS 2 TABLET(S): 8.6 TABLET ORAL at 21:36

## 2018-12-15 RX ADMIN — Medication 20 MILLIEQUIVALENT(S): at 10:44

## 2018-12-15 RX ADMIN — Medication 100 MILLIGRAM(S): at 05:06

## 2018-12-15 RX ADMIN — ERTAPENEM SODIUM 120 MILLIGRAM(S): 1 INJECTION, POWDER, LYOPHILIZED, FOR SOLUTION INTRAMUSCULAR; INTRAVENOUS at 18:07

## 2018-12-15 NOTE — PROGRESS NOTE ADULT - PROBLEM SELECTOR PLAN 2
Sepsis 2/2 E. Coli bacteremia likely from UTI  Blood and urine cx sensitivities inconsistent, both E. coli; repeat bcx 12/7 NTD  CT abdo/pelvis with po/IV contrast to r/o GI source was unremarkable   was on IV ceftriaxone 1 gm q24,  ID, recommended changing to invanz for possible aspiration   ID following, recommendations appreciated  leucocytosis improving

## 2018-12-15 NOTE — PROGRESS NOTE ADULT - PROBLEM SELECTOR PLAN 5
Likely pre-renal, now resolved  with IVF  Monitor BMP, avoid nephrotoxic medications, renally dose medications  hypokalemia : will supplement

## 2018-12-15 NOTE — PROGRESS NOTE ADULT - SUBJECTIVE AND OBJECTIVE BOX
Patient is a 89y old  Female who presents with a chief complaint of pain and fever (15 Dec 2018 10:52)      SUBJECTIVE / OVERNIGHT EVENTS: patient seen and examined by bedside at 10:40 AM, pt afebrile, no acute distress noted        MEDICATIONS  (STANDING):  amLODIPine   Tablet 5 milliGRAM(s) Oral daily  ATENolol  Tablet 25 milliGRAM(s) Oral daily  dextrose 5% + sodium chloride 0.9%. 1000 milliLiter(s) (50 mL/Hr) IV Continuous <Continuous>  docusate sodium 100 milliGRAM(s) Oral two times a day  enoxaparin Injectable 30 milliGRAM(s) SubCutaneous every 24 hours  ertapenem  IVPB 1000 milliGRAM(s) IV Intermittent every 24 hours  metoclopramide Injectable 5 milliGRAM(s) IV Push once  senna 2 Tablet(s) Oral at bedtime  tiotropium 18 MICROgram(s) Capsule 1 Capsule(s) Inhalation daily    MEDICATIONS  (PRN):  ALBUTerol/ipratropium for Nebulization 3 milliLiter(s) Nebulizer every 4 hours PRN sob/wheezing      Vital Signs Last 24 Hrs  T(C): 37.2 (15 Dec 2018 12:19), Max: 37.2 (15 Dec 2018 12:19)  T(F): 99 (15 Dec 2018 12:19), Max: 99 (15 Dec 2018 12:19)  HR: 77 (15 Dec 2018 12:19) (77 - 82)  BP: 138/51 (15 Dec 2018 12:19) (138/51 - 150/70)  BP(mean): --  RR: 18 (15 Dec 2018 12:19) (18 - 18)  SpO2: 98% (15 Dec 2018 12:19) (97% - 98%)  CAPILLARY BLOOD GLUCOSE      POCT Blood Glucose.: 148 mg/dL (15 Dec 2018 12:48)    I&O's Summary    PHYSICAL EXAM  GENERAL: Elderly fail woman in bed, appears comfortable  HEAD:  Atraumatic, Normocephalic  EYES: EOMI, PERRLA, conjunctiva and sclera clear  NECK: Supple,   CHEST/LUNG: nonlabored in breathing, no wheezing    HEART: Regular rate and rhythm  ABDOMEN: Nontender, mildly  distended; Bowel sounds hypoactive   EXTREMITIES:  2+ Peripheral Pulses, No clubbing, cyanosis, or edema  PSYCH: awake, alert, pleasant       LABS:                        9.7    12.24 )-----------( 480      ( 15 Dec 2018 07:10 )             30.2     12-15    134<L>  |  98  |  18  ----------------------------<  64<L>  3.0<L>   |  24  |  0.58    Ca    8.6      15 Dec 2018 07:10  Phos  2.8     12-14  Mg     1.9     12-14                Consultant(s) Notes Reviewed:  ID    Care Discussed with Consultants/Other Providers: ID

## 2018-12-15 NOTE — SWALLOW BEDSIDE ASSESSMENT ADULT - SLP PERTINENT HISTORY OF CURRENT PROBLEM
89 F Chinese speaking who was brought in by family complaining of weakness and fevers since Monday night. Family members at bedside stated that she was well all day on Monday, and suddenly took a turn late around midnight. She had told family, that she feels weak, ill, with her body hurting. She also had some shaking with changes in mental status. She is generally not incontinent, but because of her slow ambulation she is unable to make in time to the bathroom. She denies burning with urination, shortness of breath, palpitations or chest pain.

## 2018-12-15 NOTE — PROGRESS NOTE ADULT - SUBJECTIVE AND OBJECTIVE BOX
ARASH SANDOVAL 89y [id number]    Patient is a 89y old  Female who presents with a chief complaint of pain and fever (14 Dec 2018 16:16)      Follow Up/CC:  ID following for bacteremia    Interval History/ROS: awake, no acute issues    Allergies    No Known Allergies    Intolerances        ANTIMICROBIALS:  ertapenem  IVPB 1000 every 24 hours      MEDICATIONS  (STANDING):  amLODIPine   Tablet 5 milliGRAM(s) Oral daily  ATENolol  Tablet 25 milliGRAM(s) Oral daily  dextrose 5% + sodium chloride 0.9%. 1000 milliLiter(s) (50 mL/Hr) IV Continuous <Continuous>  docusate sodium 100 milliGRAM(s) Oral two times a day  enoxaparin Injectable 30 milliGRAM(s) SubCutaneous every 24 hours  ertapenem  IVPB 1000 milliGRAM(s) IV Intermittent every 24 hours  metoclopramide Injectable 5 milliGRAM(s) IV Push once  potassium chloride  10 mEq/100 mL IVPB 10 milliEquivalent(s) IV Intermittent every 1 hour  senna 2 Tablet(s) Oral at bedtime  tiotropium 18 MICROgram(s) Capsule 1 Capsule(s) Inhalation daily    MEDICATIONS  (PRN):  ALBUTerol/ipratropium for Nebulization 3 milliLiter(s) Nebulizer every 4 hours PRN sob/wheezing        Vital Signs Last 24 Hrs  T(C): 36.4 (15 Dec 2018 05:05), Max: 36.8 (14 Dec 2018 13:23)  T(F): 97.5 (15 Dec 2018 05:05), Max: 98.3 (14 Dec 2018 13:23)  HR: 82 (15 Dec 2018 05:05) (79 - 87)  BP: 150/70 (15 Dec 2018 05:05) (145/62 - 165/85)  BP(mean): --  RR: 18 (15 Dec 2018 05:05) (18 - 18)  SpO2: 97% (15 Dec 2018 05:05) (93% - 97%)    CBC Full  -  ( 15 Dec 2018 07:10 )  WBC Count : 12.24 K/uL  Hemoglobin : 9.7 g/dL  Hematocrit : 30.2 %  Platelet Count - Automated : 480 K/uL  Mean Cell Volume : 68.6 fL  Mean Cell Hemoglobin : 22.0 pg  Mean Cell Hemoglobin Concentration : 32.1 %  Auto Neutrophil # : x  Auto Lymphocyte # : x  Auto Monocyte # : x  Auto Eosinophil # : x  Auto Basophil # : x  Auto Neutrophil % : x  Auto Lymphocyte % : x  Auto Monocyte % : x  Auto Eosinophil % : x  Auto Basophil % : x    12-15    134<L>  |  98  |  18  ----------------------------<  64<L>  3.0<L>   |  24  |  0.58    Ca    8.6      15 Dec 2018 07:10  Phos  2.8     12-14  Mg     1.9     12-14            MICROBIOLOGY:  BLOOD PERIPHERAL  12-12-18 --  --  --      BLOOD  12-12-18 --  --  --      BLOOD  12-07-18 --  --  --      URINE CATHETER  12-06-18 --  --  Escherichia coli      BLOOD PERIPHERAL  12-06-18 --  --  BLOOD CULTURE PCR  Escherichia coli      RADIOLOGY    < from: Xray Chest 1 View AP/PA (12.12.18 @ 14:50) >  Distended air-filled esophagus and small hiatus hernia.   Prominent gas-filled bowel loops seen in the abdomen. Please see report   ofabdominal x-rays obtained at the same time for further detail.    Hazy opacity at the left base, likely a small pleural effusion with   passive atelectasis seen on the CT scan, although infection is not   excluded.

## 2018-12-15 NOTE — SWALLOW BEDSIDE ASSESSMENT ADULT - COMMENTS
As per most recent chest xray performed on 12/12/2018 "INTERPRETATION: Heart size and the mediastinum cannot be accurately evaluated on this projection. There is a calcified tortuous thoracic aorta. A distended air-filled esophagus and small hiatus hernia is seen. Prominent gas-filled bowel loops are seen in the abdomen. The right lung is clear. Hazy opacity at the left base likely corresponds to a small pleural effusion with passive atelectasis seen on the CT scan. Infection is not excluded, however. The left upper and mid lung appear clear. No right pleural effusion is seen. No pneumothorax is noted."    As per son, patient with no history of dysphagia and has never had any coughing/choking during meals at home.    Nursing cleared patient for swallowing evaluation.       services were declined by son, as he preferred to interpret for patient in Cantonese.

## 2018-12-15 NOTE — SWALLOW BEDSIDE ASSESSMENT ADULT - ASR SWALLOW DENTITION
Son reports dentures are at home, however patient does not typically wear them as they are ill-fitting/incomplete

## 2018-12-15 NOTE — SWALLOW BEDSIDE ASSESSMENT ADULT - SWALLOW EVAL: DIAGNOSIS
Patient presented with mild oral stage dysphagia for solids marked by mildly increased mastication however when provided with puree and mechanical soft solids, the patient was able to demonstrate improved mastication despite incomplete dentition with adequate bolus formation, transfer, and clearance.  The patient demonstrated functional pharyngeal stage for puree, mechanical soft solids, solids, and thin liquids marked by prompt swallow trigger, adequate hyolaryngeal elevation, and no overt s/s of penetration/aspiration.

## 2018-12-15 NOTE — SWALLOW BEDSIDE ASSESSMENT ADULT - ORAL PREPARATORY PHASE
mildly increased mastication with solids, improved mastication time for mechanical soft as this consistency was orally managed better with limited dentition./Within functional limits Within functional limits

## 2018-12-15 NOTE — PROGRESS NOTE ADULT - PROBLEM SELECTOR PLAN 1
improved No urine retention per bladder scan   etiology unclear    abd xray noted, pt had BM after suppository, on clear liquid diet  Distended air-filled esophagus and small hiatus hernia. ,Prominent gas-filled bowel loops seen in the abdomen seen on CXR,  GI eval and recommendation appreciated, c/w supportive care, no EGD at this time   leucocytosis trending down

## 2018-12-15 NOTE — PROGRESS NOTE ADULT - ATTENDING COMMENTS
Jake Montoya  Attending Physician   Division of Infectious Disease  Pager #[phone]  After 5pm/weekend or no response, call #[phone]    Please call the ID service [phone] with questions or concerns over the weekend.

## 2018-12-16 LAB
BUN SERPL-MCNC: 15 MG/DL — SIGNIFICANT CHANGE UP (ref 7–23)
CALCIUM SERPL-MCNC: 8.5 MG/DL — SIGNIFICANT CHANGE UP (ref 8.4–10.5)
CHLORIDE SERPL-SCNC: 99 MMOL/L — SIGNIFICANT CHANGE UP (ref 98–107)
CO2 SERPL-SCNC: 25 MMOL/L — SIGNIFICANT CHANGE UP (ref 22–31)
CREAT SERPL-MCNC: 0.6 MG/DL — SIGNIFICANT CHANGE UP (ref 0.5–1.3)
GLUCOSE SERPL-MCNC: 84 MG/DL — SIGNIFICANT CHANGE UP (ref 70–99)
HCT VFR BLD CALC: 30.9 % — LOW (ref 34.5–45)
HGB BLD-MCNC: 9.9 G/DL — LOW (ref 11.5–15.5)
MCHC RBC-ENTMCNC: 21.8 PG — LOW (ref 27–34)
MCHC RBC-ENTMCNC: 32 % — SIGNIFICANT CHANGE UP (ref 32–36)
MCV RBC AUTO: 68.1 FL — LOW (ref 80–100)
NRBC # FLD: 0 — SIGNIFICANT CHANGE UP
PLATELET # BLD AUTO: 467 K/UL — HIGH (ref 150–400)
PMV BLD: 10.3 FL — SIGNIFICANT CHANGE UP (ref 7–13)
POTASSIUM SERPL-MCNC: 3.9 MMOL/L — SIGNIFICANT CHANGE UP (ref 3.5–5.3)
POTASSIUM SERPL-SCNC: 3.9 MMOL/L — SIGNIFICANT CHANGE UP (ref 3.5–5.3)
RBC # BLD: 4.54 M/UL — SIGNIFICANT CHANGE UP (ref 3.8–5.2)
RBC # FLD: 14.7 % — HIGH (ref 10.3–14.5)
SODIUM SERPL-SCNC: 135 MMOL/L — SIGNIFICANT CHANGE UP (ref 135–145)
WBC # BLD: 12.3 K/UL — HIGH (ref 3.8–10.5)
WBC # FLD AUTO: 12.3 K/UL — HIGH (ref 3.8–10.5)

## 2018-12-16 PROCEDURE — 99232 SBSQ HOSP IP/OBS MODERATE 35: CPT

## 2018-12-16 RX ORDER — TIOTROPIUM BROMIDE 18 UG/1
1 CAPSULE ORAL; RESPIRATORY (INHALATION) DAILY
Qty: 0 | Refills: 0 | Status: DISCONTINUED | OUTPATIENT
Start: 2018-12-16 | End: 2018-12-17

## 2018-12-16 RX ADMIN — Medication 100 MILLIGRAM(S): at 17:53

## 2018-12-16 RX ADMIN — AMLODIPINE BESYLATE 5 MILLIGRAM(S): 2.5 TABLET ORAL at 05:30

## 2018-12-16 RX ADMIN — Medication 325 MILLIGRAM(S): at 12:26

## 2018-12-16 RX ADMIN — ATENOLOL 25 MILLIGRAM(S): 25 TABLET ORAL at 05:30

## 2018-12-16 RX ADMIN — Medication 100 MILLIGRAM(S): at 05:30

## 2018-12-16 RX ADMIN — ERTAPENEM SODIUM 120 MILLIGRAM(S): 1 INJECTION, POWDER, LYOPHILIZED, FOR SOLUTION INTRAMUSCULAR; INTRAVENOUS at 17:52

## 2018-12-16 RX ADMIN — ENOXAPARIN SODIUM 30 MILLIGRAM(S): 100 INJECTION SUBCUTANEOUS at 17:53

## 2018-12-16 NOTE — PROGRESS NOTE ADULT - PROBLEM SELECTOR PLAN 5
Likely pre-renal, now resolved  with IVF  Monitor BMP, avoid nephrotoxic medications, renally dose medications  hypokalemia : will supplement Likely pre-renal, now resolved  with IVF  Monitor BMP, avoid nephrotoxic medications, renally dose medications  hypokalemia : resolved with  supplement

## 2018-12-16 NOTE — PROGRESS NOTE ADULT - PROBLEM SELECTOR PLAN 1
improved No urine retention per bladder scan   etiology unclear    abd xray noted, pt had BM after suppository, on clear liquid diet  Distended air-filled esophagus and small hiatus hernia. ,Prominent gas-filled bowel loops seen in the abdomen seen on CXR,  GI eval and recommendation appreciated, c/w supportive care, no EGD at this time   leucocytosis trending down improved  Distended air-filled esophagus and small hiatus hernia. ,Prominent gas-filled bowel loops seen in the abdomen seen on CXR,  GI eval and recommendation appreciated, c/w supportive care, no EGD at this time   leucocytosis trending down

## 2018-12-16 NOTE — PROGRESS NOTE ADULT - PROBLEM SELECTOR PLAN 3
-low grade temp+  -?aspiration given abd distension, ileus, vomiting  -invanz 1 gm iv q24  -hope to dc abx in AM

## 2018-12-16 NOTE — PROGRESS NOTE ADULT - ATTENDING COMMENTS
Jake Montoya  Attending Physician   Division of Infectious Disease  Pager #[phone]  After 5pm/weekend or no response, call #[phone]

## 2018-12-16 NOTE — PROGRESS NOTE ADULT - PROBLEM SELECTOR PLAN 2
Sepsis 2/2 E. Coli bacteremia likely from UTI  Blood and urine cx sensitivities inconsistent, both E. coli; repeat bcx 12/7 NTD  CT abdo/pelvis with po/IV contrast to r/o GI source was unremarkable   was on IV ceftriaxone 1 gm q24,  ID, recommended changing to invanz for possible aspiration   ID following, recommendations appreciated  leucocytosis improving Sepsis 2/2 E. Coli bacteremia likely from UTI  Blood and urine cx sensitivities inconsistent, both E. coli; repeat bcx 12/7 NTD  CT abdo/pelvis with po/IV contrast to r/o GI source was unremarkable   was on IV ceftriaxone 1 gm q24,  ID, recommended changing to invanz for possible aspiration   ID following, recommendations appreciated, case discussed, hope to DC Abx in am   leucocytosis improving

## 2018-12-16 NOTE — PROGRESS NOTE ADULT - SUBJECTIVE AND OBJECTIVE BOX
ARASH SANDOVAL 89y [id number]    Patient is a 89y old  Female who presents with a chief complaint of pain and fever (16 Dec 2018 14:14)      Follow Up/CC:  ID following for bacteremia    Interval History/ROS: no acute issues, no fever    Allergies    No Known Allergies    Intolerances        ANTIMICROBIALS:  ertapenem  IVPB 1000 every 24 hours      MEDICATIONS  (STANDING):  amLODIPine   Tablet 5 milliGRAM(s) Oral daily  ATENolol  Tablet 25 milliGRAM(s) Oral daily  dextrose 5% + sodium chloride 0.9%. 1000 milliLiter(s) (50 mL/Hr) IV Continuous <Continuous>  docusate sodium 100 milliGRAM(s) Oral two times a day  enoxaparin Injectable 30 milliGRAM(s) SubCutaneous every 24 hours  ertapenem  IVPB 1000 milliGRAM(s) IV Intermittent every 24 hours  ferrous    sulfate 325 milliGRAM(s) Oral daily  metoclopramide Injectable 5 milliGRAM(s) IV Push once  senna 2 Tablet(s) Oral at bedtime  tiotropium 18 MICROgram(s) Capsule 1 Capsule(s) Inhalation daily    MEDICATIONS  (PRN):  ALBUTerol/ipratropium for Nebulization 3 milliLiter(s) Nebulizer every 4 hours PRN sob/wheezing        Vital Signs Last 24 Hrs  T(C): 36.7 (16 Dec 2018 12:29), Max: 36.7 (16 Dec 2018 12:29)  T(F): 98 (16 Dec 2018 12:29), Max: 98 (16 Dec 2018 12:29)  HR: 68 (16 Dec 2018 12:29) (68 - 88)  BP: 147/53 (16 Dec 2018 12:29) (120/57 - 153/71)  BP(mean): --  RR: 18 (16 Dec 2018 12:29) (18 - 18)  SpO2: 97% (16 Dec 2018 12:29) (97% - 98%)    CBC Full  -  ( 16 Dec 2018 05:12 )  WBC Count : 12.30 K/uL  Hemoglobin : 9.9 g/dL  Hematocrit : 30.9 %  Platelet Count - Automated : 467 K/uL  Mean Cell Volume : 68.1 fL  Mean Cell Hemoglobin : 21.8 pg  Mean Cell Hemoglobin Concentration : 32.0 %  Auto Neutrophil # : x  Auto Lymphocyte # : x  Auto Monocyte # : x  Auto Eosinophil # : x  Auto Basophil # : x  Auto Neutrophil % : x  Auto Lymphocyte % : x  Auto Monocyte % : x  Auto Eosinophil % : x  Auto Basophil % : x    12-16    135  |  99  |  15  ----------------------------<  84  3.9   |  25  |  0.60    Ca    8.5      16 Dec 2018 05:12            MICROBIOLOGY:  BLOOD PERIPHERAL  12-12-18 --  --  --      BLOOD  12-12-18 --  --  --      BLOOD  12-07-18 --  --  --      URINE CATHETER    12-06-18 --  --  Escherichia coli      BLOOD PERIPHERAL  12-06-18 --  --  BLOOD CULTURE PCR  Escherichia coli    RADIOLOGY    < from: Xray Chest 1 View AP/PA (12.12.18 @ 14:50) >  Distended air-filled esophagus and small hiatus hernia.   Prominent gas-filled bowel loops seen in the abdomen. Please see report   ofabdominal x-rays obtained at the same time for further detail.    Hazy opacity at the left base, likely a small pleural effusion with   passive atelectasis seen on the CT scan, although infection is not   excluded.    < end of copied text >

## 2018-12-16 NOTE — PROGRESS NOTE ADULT - SUBJECTIVE AND OBJECTIVE BOX
Patient is a 89y old  Female who presents with a chief complaint of pain and fever (15 Dec 2018 14:30)      SUBJECTIVE / OVERNIGHT EVENTS:    MEDICATIONS  (STANDING):  amLODIPine   Tablet 5 milliGRAM(s) Oral daily  ATENolol  Tablet 25 milliGRAM(s) Oral daily  dextrose 5% + sodium chloride 0.9%. 1000 milliLiter(s) (50 mL/Hr) IV Continuous <Continuous>  docusate sodium 100 milliGRAM(s) Oral two times a day  enoxaparin Injectable 30 milliGRAM(s) SubCutaneous every 24 hours  ertapenem  IVPB 1000 milliGRAM(s) IV Intermittent every 24 hours  ferrous    sulfate 325 milliGRAM(s) Oral daily  metoclopramide Injectable 5 milliGRAM(s) IV Push once  senna 2 Tablet(s) Oral at bedtime  tiotropium 18 MICROgram(s) Capsule 1 Capsule(s) Inhalation daily    MEDICATIONS  (PRN):  ALBUTerol/ipratropium for Nebulization 3 milliLiter(s) Nebulizer every 4 hours PRN sob/wheezing      Vital Signs Last 24 Hrs  T(C): 36.7 (16 Dec 2018 12:29), Max: 36.7 (16 Dec 2018 12:29)  T(F): 98 (16 Dec 2018 12:29), Max: 98 (16 Dec 2018 12:29)  HR: 68 (16 Dec 2018 12:29) (68 - 88)  BP: 147/53 (16 Dec 2018 12:29) (120/57 - 153/71)  BP(mean): --  RR: 18 (16 Dec 2018 12:29) (18 - 18)  SpO2: 97% (16 Dec 2018 12:29) (97% - 98%)  CAPILLARY BLOOD GLUCOSE        I&O's Summary      PHYSICAL EXAM:  GENERAL: NAD, well-developed  HEAD:  Atraumatic, Normocephalic  EYES: EOMI, PERRLA, conjunctiva and sclera clear  NECK: Supple, No JVD  CHEST/LUNG: Clear to auscultation bilaterally; No wheeze  HEART: Regular rate and rhythm; No murmurs, rubs, or gallops  ABDOMEN: Soft, Nontender, Nondistended; Bowel sounds present  EXTREMITIES:  2+ Peripheral Pulses, No clubbing, cyanosis, or edema  PSYCH: AAOx3  NEUROLOGY: non-focal  SKIN: No rashes or lesions    LABS:                        9.9    12.30 )-----------( 467      ( 16 Dec 2018 05:12 )             30.9     12-16    135  |  99  |  15  ----------------------------<  84  3.9   |  25  |  0.60    Ca    8.5      16 Dec 2018 05:12                RADIOLOGY & ADDITIONAL TESTS:    Imaging Personally Reviewed:    Consultant(s) Notes Reviewed:      Care Discussed with Consultants/Other Providers: Patient is a 89y old  Female who presents with a chief complaint of pain and fever (15 Dec 2018 14:30)      SUBJECTIVE / OVERNIGHT EVENTS: patient seen and examined by bedside at 10:10 Am, no acute distress noted  afebrile,   family at bedside       MEDICATIONS  (STANDING):  amLODIPine   Tablet 5 milliGRAM(s) Oral daily  ATENolol  Tablet 25 milliGRAM(s) Oral daily  dextrose 5% + sodium chloride 0.9%. 1000 milliLiter(s) (50 mL/Hr) IV Continuous <Continuous>  docusate sodium 100 milliGRAM(s) Oral two times a day  enoxaparin Injectable 30 milliGRAM(s) SubCutaneous every 24 hours  ertapenem  IVPB 1000 milliGRAM(s) IV Intermittent every 24 hours  ferrous    sulfate 325 milliGRAM(s) Oral daily  metoclopramide Injectable 5 milliGRAM(s) IV Push once  senna 2 Tablet(s) Oral at bedtime  tiotropium 18 MICROgram(s) Capsule 1 Capsule(s) Inhalation daily    MEDICATIONS  (PRN):  ALBUTerol/ipratropium for Nebulization 3 milliLiter(s) Nebulizer every 4 hours PRN sob/wheezing      Vital Signs Last 24 Hrs  T(C): 36.7 (16 Dec 2018 12:29), Max: 36.7 (16 Dec 2018 12:29)  T(F): 98 (16 Dec 2018 12:29), Max: 98 (16 Dec 2018 12:29)  HR: 68 (16 Dec 2018 12:29) (68 - 88)  BP: 147/53 (16 Dec 2018 12:29) (120/57 - 153/71)  BP(mean): --  RR: 18 (16 Dec 2018 12:29) (18 - 18)  SpO2: 97% (16 Dec 2018 12:29) (97% - 98%)  CAPILLARY BLOOD GLUCOSE        PHYSICAL EXAM  GENERAL: Elderly fail woman in bed, appears comfortable  HEAD:  Atraumatic, Normocephalic  EYES: EOMI, PERRLA, conjunctiva and sclera clear  NECK: Supple,   CHEST/LUNG: nonlabored in breathing, no wheezing    HEART: Regular rate and rhythm  ABDOMEN: Nontender, mildly  distended; Bowel sounds hypoactive   EXTREMITIES:  2+ Peripheral Pulses, No clubbing, cyanosis, or edema  PSYCH: awake, alert, pleasant         LABS:                        9.9    12.30 )-----------( 467      ( 16 Dec 2018 05:12 )             30.9     12-16    135  |  99  |  15  ----------------------------<  84  3.9   |  25  |  0.60    Ca    8.5      16 Dec 2018 05:12                    Consultant(s) Notes Reviewed:  ID    Care Discussed with Consultants/Other Providers: ID

## 2018-12-17 VITALS
HEART RATE: 68 BPM | SYSTOLIC BLOOD PRESSURE: 133 MMHG | DIASTOLIC BLOOD PRESSURE: 55 MMHG | RESPIRATION RATE: 18 BRPM | OXYGEN SATURATION: 97 % | TEMPERATURE: 98 F

## 2018-12-17 LAB
BACTERIA BLD CULT: SIGNIFICANT CHANGE UP
BACTERIA BLD CULT: SIGNIFICANT CHANGE UP
BUN SERPL-MCNC: 9 MG/DL — SIGNIFICANT CHANGE UP (ref 7–23)
CALCIUM SERPL-MCNC: 8.5 MG/DL — SIGNIFICANT CHANGE UP (ref 8.4–10.5)
CHLORIDE SERPL-SCNC: 100 MMOL/L — SIGNIFICANT CHANGE UP (ref 98–107)
CO2 SERPL-SCNC: 26 MMOL/L — SIGNIFICANT CHANGE UP (ref 22–31)
CREAT SERPL-MCNC: 0.46 MG/DL — LOW (ref 0.5–1.3)
GLUCOSE SERPL-MCNC: 100 MG/DL — HIGH (ref 70–99)
HCT VFR BLD CALC: 33.7 % — LOW (ref 34.5–45)
HGB BLD-MCNC: 10.4 G/DL — LOW (ref 11.5–15.5)
MCHC RBC-ENTMCNC: 21.7 PG — LOW (ref 27–34)
MCHC RBC-ENTMCNC: 30.9 % — LOW (ref 32–36)
MCV RBC AUTO: 70.2 FL — LOW (ref 80–100)
NRBC # FLD: 0 — SIGNIFICANT CHANGE UP
PLATELET # BLD AUTO: 546 K/UL — HIGH (ref 150–400)
PMV BLD: 10.2 FL — SIGNIFICANT CHANGE UP (ref 7–13)
POTASSIUM SERPL-MCNC: 3.8 MMOL/L — SIGNIFICANT CHANGE UP (ref 3.5–5.3)
POTASSIUM SERPL-SCNC: 3.8 MMOL/L — SIGNIFICANT CHANGE UP (ref 3.5–5.3)
RBC # BLD: 4.8 M/UL — SIGNIFICANT CHANGE UP (ref 3.8–5.2)
RBC # FLD: 14.9 % — HIGH (ref 10.3–14.5)
SODIUM SERPL-SCNC: 136 MMOL/L — SIGNIFICANT CHANGE UP (ref 135–145)
WBC # BLD: 10.71 K/UL — HIGH (ref 3.8–10.5)
WBC # FLD AUTO: 10.71 K/UL — HIGH (ref 3.8–10.5)

## 2018-12-17 PROCEDURE — 99232 SBSQ HOSP IP/OBS MODERATE 35: CPT

## 2018-12-17 PROCEDURE — 99239 HOSP IP/OBS DSCHRG MGMT >30: CPT

## 2018-12-17 RX ORDER — SENNA PLUS 8.6 MG/1
2 TABLET ORAL
Qty: 60 | Refills: 0 | OUTPATIENT
Start: 2018-12-17

## 2018-12-17 RX ORDER — DOCUSATE SODIUM 100 MG
1 CAPSULE ORAL
Qty: 60 | Refills: 0 | OUTPATIENT
Start: 2018-12-17

## 2018-12-17 RX ORDER — FERROUS SULFATE 325(65) MG
1 TABLET ORAL
Qty: 30 | Refills: 0 | OUTPATIENT
Start: 2018-12-17 | End: 2019-01-15

## 2018-12-17 RX ORDER — ALBUTEROL 90 UG/1
2 AEROSOL, METERED ORAL
Qty: 1 | Refills: 0 | OUTPATIENT
Start: 2018-12-17

## 2018-12-17 RX ORDER — AMLODIPINE BESYLATE 2.5 MG/1
1 TABLET ORAL
Qty: 30 | Refills: 0 | OUTPATIENT
Start: 2018-12-17

## 2018-12-17 RX ORDER — CALCITONIN SALMON 200 [IU]/ML
0 INJECTION, SOLUTION INTRAMUSCULAR
Qty: 0 | Refills: 0 | COMMUNITY

## 2018-12-17 RX ORDER — ERTAPENEM SODIUM 1 G/1
1000 INJECTION, POWDER, LYOPHILIZED, FOR SOLUTION INTRAMUSCULAR; INTRAVENOUS EVERY 24 HOURS
Qty: 0 | Refills: 0 | Status: DISCONTINUED | OUTPATIENT
Start: 2018-12-17 | End: 2018-12-17

## 2018-12-17 RX ORDER — ATENOLOL 25 MG/1
1 TABLET ORAL
Qty: 0 | Refills: 0 | COMMUNITY

## 2018-12-17 RX ORDER — ATENOLOL 25 MG/1
1 TABLET ORAL
Qty: 30 | Refills: 0 | OUTPATIENT
Start: 2018-12-17

## 2018-12-17 RX ORDER — TIOTROPIUM BROMIDE 18 UG/1
1 CAPSULE ORAL; RESPIRATORY (INHALATION)
Qty: 1 | Refills: 0 | OUTPATIENT
Start: 2018-12-17

## 2018-12-17 RX ADMIN — ATENOLOL 25 MILLIGRAM(S): 25 TABLET ORAL at 05:12

## 2018-12-17 RX ADMIN — ERTAPENEM SODIUM 120 MILLIGRAM(S): 1 INJECTION, POWDER, LYOPHILIZED, FOR SOLUTION INTRAMUSCULAR; INTRAVENOUS at 14:45

## 2018-12-17 RX ADMIN — Medication 325 MILLIGRAM(S): at 12:41

## 2018-12-17 RX ADMIN — AMLODIPINE BESYLATE 5 MILLIGRAM(S): 2.5 TABLET ORAL at 05:12

## 2018-12-17 NOTE — PROGRESS NOTE ADULT - NEGATIVE RESPIRATORY AND THORAX SYMPTOMS
no dyspnea/no wheezing
no wheezing/no dyspnea
no dyspnea/no wheezing
no wheezing/no dyspnea
no wheezing/no dyspnea
no dyspnea/no wheezing
no dyspnea/no wheezing

## 2018-12-17 NOTE — PROGRESS NOTE ADULT - NEGATIVE ENMT SYMPTOMS
no nasal congestion/no ear pain/no throat pain
no ear pain/no nasal congestion/no throat pain
no ear pain/no nasal congestion/no throat pain
no nasal congestion/no ear pain/no throat pain
no nasal congestion/no throat pain/no ear pain
no throat pain/no nasal congestion/no ear pain
no throat pain/no nasal congestion/no ear pain
no nasal congestion/no ear pain/no throat pain

## 2018-12-17 NOTE — PROGRESS NOTE ADULT - PROBLEM SELECTOR PLAN 2
Sepsis 2/2 E. Coli bacteremia likely from UTI  Blood and urine cx sensitivities inconsistent, both E. coli; repeat bcx 12/7 NTD  CT abdo/pelvis with po/IV contrast to r/o GI source was unremarkable   was on IV ceftriaxone 1 gm q24,  ID, recommended changing to invanz for possible aspiration   ID following, recommendations appreciated, case discussed, hope to DC Abx in am   leucocytosis improving

## 2018-12-17 NOTE — PROGRESS NOTE ADULT - ATTENDING COMMENTS
Jake Montoya  Attending Physician   Division of Infectious Disease  Pager #[phone]  After 5pm/weekend or no response, call #[phone]

## 2018-12-17 NOTE — PROGRESS NOTE ADULT - PROBLEM SELECTOR PLAN 4
Metabolic encephalopathy due to sepsis 2/2 E. Coli bacteremia likely from UTI  Mental status improving with abx, continue to monitor, likely at baseline

## 2018-12-17 NOTE — PROGRESS NOTE ADULT - SUBJECTIVE AND OBJECTIVE BOX
ARASH SANDOVAL 89y [id number]    Patient is a 89y old  Female who presents with a chief complaint of pain and fever (17 Dec 2018 12:03)      Follow Up/CC:  ID following for bacteremia    Interval History/ROS: no acute issues, no fever    Allergies    No Known Allergies    Intolerances        ANTIMICROBIALS:  ertapenem  IVPB 1000 every 24 hours      MEDICATIONS  (STANDING):  amLODIPine   Tablet 5 milliGRAM(s) Oral daily  ATENolol  Tablet 25 milliGRAM(s) Oral daily  dextrose 5% + sodium chloride 0.9%. 1000 milliLiter(s) (50 mL/Hr) IV Continuous <Continuous>  docusate sodium 100 milliGRAM(s) Oral two times a day  enoxaparin Injectable 30 milliGRAM(s) SubCutaneous every 24 hours  ertapenem  IVPB 1000 milliGRAM(s) IV Intermittent every 24 hours  ferrous    sulfate 325 milliGRAM(s) Oral daily  metoclopramide Injectable 5 milliGRAM(s) IV Push once  senna 2 Tablet(s) Oral at bedtime  tiotropium 18 MICROgram(s) Capsule 1 Capsule(s) Inhalation daily    MEDICATIONS  (PRN):  ALBUTerol/ipratropium for Nebulization 3 milliLiter(s) Nebulizer every 4 hours PRN sob/wheezing        Vital Signs Last 24 Hrs  T(C): 36.9 (17 Dec 2018 14:07), Max: 37.3 (17 Dec 2018 05:09)  T(F): 98.4 (17 Dec 2018 14:07), Max: 99.1 (17 Dec 2018 05:09)  HR: 68 (17 Dec 2018 14:07) (68 - 90)  BP: 133/55 (17 Dec 2018 14:07) (131/65 - 137/68)  BP(mean): --  RR: 18 (17 Dec 2018 14:07) (18 - 18)  SpO2: 97% (17 Dec 2018 14:07) (97% - 100%)    CBC Full  -  ( 17 Dec 2018 06:50 )  WBC Count : 10.71 K/uL  Hemoglobin : 10.4 g/dL  Hematocrit : 33.7 %  Platelet Count - Automated : 546 K/uL  Mean Cell Volume : 70.2 fL  Mean Cell Hemoglobin : 21.7 pg  Mean Cell Hemoglobin Concentration : 30.9 %  Auto Neutrophil # : x  Auto Lymphocyte # : x  Auto Monocyte # : x  Auto Eosinophil # : x  Auto Basophil # : x  Auto Neutrophil % : x  Auto Lymphocyte % : x  Auto Monocyte % : x  Auto Eosinophil % : x  Auto Basophil % : x    12-17    136  |  100  |  9   ----------------------------<  100<H>  3.8   |  26  |  0.46<L>    Ca    8.5      17 Dec 2018 06:50            MICROBIOLOGY:  BLOOD PERIPHERAL  12-12-18 --  --  --      BLOOD  12-12-18 --  --  --      BLOOD  12-07-18 --  --  --      URINE CATHETER  12-06-18 --  --  Escherichia coli      BLOOD PERIPHERAL  12-06-18 --  --  BLOOD CULTURE PCR  Escherichia coli    RADIOLOGY    < from: Xray Chest 1 View AP/PA (12.12.18 @ 14:50) >  Distended air-filled esophagus and small hiatus hernia.   Prominent gas-filled bowel loops seen in the abdomen. Please see report   ofabdominal x-rays obtained at the same time for further detail.    Hazy opacity at the left base, likely a small pleural effusion with   passive atelectasis seen on the CT scan, although infection is not   excluded.

## 2018-12-17 NOTE — PROGRESS NOTE ADULT - PROBLEM SELECTOR PROBLEM 2
Cough
Infectious encephalopathy
Infectious encephalopathy
Sepsis due to Escherichia coli
Hyponatremia
Escherichia coli urinary tract infection

## 2018-12-17 NOTE — PROGRESS NOTE ADULT - PROBLEM SELECTOR PLAN 3
TTE with normal LVSF, no documented diastolic dysfunction  CXR clear, RVP neg x2; possible reactive airway disease and/or atelectasis   CT chest with IV contrast shows Small bilateral pleural effusions with passive atelectasis of the bilateral lower lobes.  Conservative management for now,  will change nebs to PRN as pt not wheezing   chest PT   OOB to chair when possible TTE with normal LVSF, no documented diastolic dysfunction  CXR clear, RVP neg x2; possible reactive airway disease and/or atelectasis   CT chest with IV contrast shows Small bilateral pleural effusions with passive atelectasis of the bilateral lower lobes.  Conservative management for now,  will change nebs to PRN as pt not wheezing   chest PT   OOB to chair when possible   Aspiration precaution, family advised not to encouragement and assistance with feeding in supine position

## 2018-12-17 NOTE — PROGRESS NOTE ADULT - REASON FOR ADMISSION
pain and fever

## 2018-12-17 NOTE — CHART NOTE - NSCHARTNOTEFT_GEN_A_CORE
Letter of medical necessity: Semi-electric hospital bed with gel overlay - Patient requires head of bed to be elevated at least 30 degrees due to Letter of medical necessity: Semi-electric hospital bed with gel overlay - Patient requires head of bed to be elevated at least 30 degrees due to aspiration precautions. Patient requires frequent positioning body positioning not feasible in ordinary bed. Gel overlay necessary to prevent skin breakdown

## 2018-12-17 NOTE — PROGRESS NOTE ADULT - NEGATIVE MUSCULOSKELETAL SYMPTOMS
no back pain

## 2018-12-17 NOTE — PROGRESS NOTE ADULT - GASTROINTESTINAL DETAILS
distension improved/no guarding/no distention/no rebound tenderness/no rigidity/nontender/soft
no guarding/no rigidity/no distention/nontender/soft
nontender/no distention/no guarding/no rebound tenderness/no rigidity/soft
no guarding/no rebound tenderness/no rigidity
nontender/no distention/soft/no rebound tenderness/no rigidity/no guarding
no distention/nontender/soft/no rigidity/no guarding
nontender/no rebound tenderness/no rigidity/no distention/soft/no guarding
soft/nontender/no guarding

## 2018-12-17 NOTE — PROGRESS NOTE ADULT - PROBLEM SELECTOR PROBLEM 1
Abdominal distension
Sepsis due to Escherichia coli
NEREIDA (acute kidney injury)
Bacteremia due to Escherichia coli

## 2018-12-17 NOTE — PROGRESS NOTE ADULT - NEGATIVE CARDIOVASCULAR SYMPTOMS
no palpitations/no chest pain
no chest pain/no palpitations
no palpitations/no chest pain
no chest pain/no palpitations
no chest pain/no palpitations
no palpitations/no chest pain

## 2018-12-17 NOTE — PROGRESS NOTE ADULT - NSHPATTENDINGPLANDISCUSS_GEN_ALL_CORE
Dr. Parisi
Daughter at bedside, primary team
Son at bedside, primary team
Pulmonary team
Pulmonary team
patient and family at bedside
patient , family and ADS PA
ADS PA
patient , family and ADS PA
Dr. Parisi
daughter at bedside
ADS PA
Dr. Mckeon
ADS PA
Dr. Parisi

## 2018-12-17 NOTE — PROGRESS NOTE ADULT - COMMENTS
harman at bedside helped with translation
daughter at bedside helped with translation
daughter at bedside helped with translation
harman at bedside helped with translation
daughter at bedside helped with translation
harman at bedside helped with translation
daughter at bedside helped with translation

## 2018-12-17 NOTE — PROGRESS NOTE ADULT - ASSESSMENT
89 y/o female brought in by family members due sepsis and acute infectious encephalopathy secondary to a UTI.
89 year old Lady, Chinese speaking who was brought in by family complaining of weakness and fevers since Monday night with fever, leukocytosis, E coil bacteremia/UTI, sepsis    Problem/Recommendation - 1:  Problem: Sepsis due to Escherichia coli. Recommendation:   -from possible UTI/bacteremia  -Can change invanz to ceftriaxone 1 gram IV q 24 hours  -E. coli in urine and blood with different susceptibilities - would check CT A/P with contrast to r/o other source of infection       Problem/Recommendation - 2:  ·  Problem: Fever due to infection.  Recommendation: -improved  -due to suspected uti/bacteremia   -Now afebrile     Problem/Recommendation - 3:  ·  Problem: Leukocytosis.  Recommendation: -improving  -prob from infection  -cont abx.      Problem/Recommendation - 4:  ·  Problem: Escherichia coli urinary tract infection.  Recommendation: -can change to ceftriaxone 1 gram IV q 24 hours  -will adjust abx based on sensitivities.      Problem/Recommendation - 5:  ·  Problem: Bacteremia due to Escherichia coli.  Recommendation: -as above  -suspect from UTI.   -CT a/p to r/o other infectious source    Problem/Recommendation - 6:  ·  Problem: Wheezing.  Recommendation:   -CXR no acute pulmonary disease  -Pulm evaluation
89 year old Lady, Chinese speaking who was brought in by family complaining of weakness and fevers since Monday night with fever, leukocytosis, E coil bacteremia/UTI, sepsis    Problem/Recommendation - 1:  Problem: Sepsis due to Escherichia coli. Recommendation: -new problem  -from UTI/bacteremia  -f/u final cultures/sensitivities  -Continue invanz 1 gm iv q24       Problem/Recommendation - 2:  ·  Problem: Fever due to infection.  Recommendation: -better  -due to uti/bacteremia. -Now afebrile     Problem/Recommendation - 3:  ·  Problem: Leukocytosis.  Recommendation: -improving  -prob from infection  -cont abx.      Problem/Recommendation - 4:  ·  Problem: Escherichia coli urinary tract infection.  Recommendation: -cont invanz  -f/u sensitivities  -will adjust abx based on sensitivities.      Problem/Recommendation - 5:  ·  Problem: Bacteremia due to Escherichia coli.  Recommendation: -as above  -from UTI.     Problem/Recommendation - 6:  ·  Problem: Wheezing.  Recommendation: -F/U CXR
89F hx HTN, who p/w weakness and fever since Monday. Currently being treated for E. coli bacteremia. Found to have diffuse wheezing during hospitalization. Pulm c/s'ed for wheezing    #wheezing  - wheezing improved today; would switch nebulizers to q6h standing during day and prn at night so she can sleep  - chest PT to help clear secretions  - c/w spiriva  - would have patient follow up with pulmonology as outpatient ([address] [place] [address], [address], [phone]; please e-mail [email or web address] to help make an appointment prior to discharge)  - please call with questions    --  Amada Bess MD  Pulmonary & Critical Care Medicine Fellow | PGY-7  [phone]
89F hx HTN, who p/w weakness and fever since Monday. Currently being treated for E. coli bacteremia. Found to have diffuse wheezing during hospitalization. Pulm c/s'ed for wheezing    #wheezing  -still wheezing on exam today but in no acute distress  - would change nebulizers to standing around the clock  - needs chest PT to help clear secretions  - ct chest pending    d/w primary team    --  Amada Bess MD  Pulmonary & Critical Care Medicine Fellow | PGY-7  [phone]
89 y/o female brought in by family members due sepsis and acute infectious encephalopathy secondary to a UTI.
Very pleasant 89 y/o female brought in by family members due sepsis and acute infectious encephalopathy secondary to a UTI.
Very pleasant 89 y/o female brought in by family members due sepsis and acute infectious encephalopathy secondary to a UTI.
Pt. with NEREIDA and hyponatremia in setting of UTI.
89 year old Lady, Chinese speaking who was brought in by family complaining of weakness and fevers since Monday night with fever, leukocytosis, E coil bacteremia/UTI, sepsis    Problem/Recommendation - 1:  Problem: Sepsis due to Escherichia coli. Recommendation:   -from possible UTI/bacteremia  -ceftriaxone 1 gram IV q 24 hours - day 6 of abx  -Ct negative for infection        Problem/Recommendation - 2:  ·  Problem: Fever due to infection.  Recommendation: -improved  -due to suspected uti/bacteremia   -Now afebrile     Problem/Recommendation - 3:  ·  Problem: Leukocytosis.  Recommendation: -improving  -prob from infection  -cont abx.      Problem/Recommendation - 4:  ·  Problem: Escherichia coli urinary tract infection.  Recommendation: -cont ceftriaxone 1 gram IV q 24 hours       Problem/Recommendation - 5:  ·  Problem: Bacteremia due to Escherichia coli.  Recommendation: -as above  -suspect from UTI.       Problem/Recommendation - 6:  ·  Problem: Wheezing.  Recommendation:   -CXR no acute pulmonary disease  -Pulm evaluation noted  -better
89 year old Lady, Chinese speaking who was brought in by family complaining of weakness and fevers since Monday night with fever, leukocytosis, E coil bacteremia/UTI, sepsis
89 year old Lady, Chinese speaking who was brought in by family complaining of weakness and fevers since Monday night with fever, leukocytosis, E coil bacteremia/UTI, sepsis    Problem/Recommendation - 1:  Problem: Sepsis due to Escherichia coli. Recommendation:   -from possible UTI/bacteremia  -ceftriaxone 1 gram IV q 24 hours - day 6 of abx  -Ct negative for infection        Problem/Recommendation - 2:  ·  Problem: Fever due to infection.  Recommendation: -improved  -due to suspected uti/bacteremia   -Now afebrile     Problem/Recommendation - 3:  ·  Problem: Leukocytosis.  Recommendation: -improving  -prob from infection  -cont abx.   -abd distended - check AXR     Problem/Recommendation - 4:  ·  Problem: Escherichia coli urinary tract infection.  Recommendation: -cont ceftriaxone 1 gram IV q 24 hours       Problem/Recommendation - 5:  ·  Problem: Bacteremia due to Escherichia coli.  Recommendation: -as above  -suspect from UTI.       Problem/Recommendation - 6:  ·  Problem: Wheezing.  Recommendation:   -CXR no acute pulmonary disease  -Pulm evaluation noted  -better
89 year old Lady, Chinese speaking who was brought in by family complaining of weakness and fevers since Monday night with fever, leukocytosis, E coil bacteremia/UTI, sepsis    Problem/Recommendation - 1:  Problem: Sepsis due to Escherichia coli. Recommendation:   -from possible UTI/bacteremia  -Can change invanz to ceftriaxone 1 gram IV q 24 hours  -E. coli in urine and blood with different susceptibilities - would check CT A/P with contrast to r/o other source of infection       Problem/Recommendation - 2:  ·  Problem: Fever due to infection.  Recommendation: -improved  -due to suspected uti/bacteremia   -Now afebrile     Problem/Recommendation - 3:  ·  Problem: Leukocytosis.  Recommendation: -improving  -prob from infection  -cont abx.      Problem/Recommendation - 4:  ·  Problem: Escherichia coli urinary tract infection.  Recommendation: -cont ceftriaxone 1 gram IV q 24 hours       Problem/Recommendation - 5:  ·  Problem: Bacteremia due to Escherichia coli.  Recommendation: -as above  -suspect from UTI.   -CT a/p to r/o other infectious source    Problem/Recommendation - 6:  ·  Problem: Wheezing.  Recommendation:   -CXR no acute pulmonary disease  -Pulm evaluation noted  -for CT chest
89 year old Lady, Chinese speaking who was brought in by family complaining of weakness and fevers since Monday night with fever, leukocytosis, E coil bacteremia/UTI, sepsis
89 year old Lady, Chinese speaking who was brought in by family complaining of weakness and fevers since Monday night with fever, leukocytosis, E coil bacteremia/UTI, sepsis    Problem/Recommendation - 1:  Problem: Sepsis due to Escherichia coli. Recommendation:   -from possible UTI/bacteremia  -ceftriaxone 1 gram IV q 24 hours - day 6 of abx  -Ct negative for infection        Problem/Recommendation - 2:  ·  Problem: Fever due to infection.  Recommendation: -improved  -due to suspected uti/bacteremia   -low grade temps+- ?aspiration     Problem/Recommendation - 3:  ·  Problem: Leukocytosis.  Recommendation: -improving  -prob from infection  -cont abx.   -abd distended - check AXR     Problem/Recommendation - 4:  ·  Problem: Escherichia coli urinary tract infection.  Recommendation: -cont ceftriaxone 1 gram IV q 24 hours       Problem/Recommendation - 5:  ·  Problem: Bacteremia due to Escherichia coli.  Recommendation: -as above  -suspect from UTI.       Problem/Recommendation - 6:  ·  Problem: Wheezing.  Recommendation:   -CXR no acute pulmonary disease  -Pulm evaluation noted  -better
89 year old Lady, Chinese speaking who was brought in by family complaining of weakness and fevers since Monday night with fever, leukocytosis, E coil bacteremia/UTI, sepsis
89 year old Lady, Chinese speaking who was brought in by family complaining of weakness and fevers since Monday night with fever, leukocytosis, E coil bacteremia/UTI, sepsis

## 2018-12-17 NOTE — PROGRESS NOTE ADULT - NEGATIVE GASTROINTESTINAL SYMPTOMS
no diarrhea/no abdominal pain
no abdominal pain/no diarrhea
no diarrhea/no abdominal pain
no abdominal pain/no nausea/no diarrhea/no vomiting
no diarrhea/no abdominal pain
no diarrhea/no abdominal pain
no diarrhea/no nausea/no abdominal pain/no vomiting
no diarrhea/no abdominal pain

## 2018-12-17 NOTE — PROGRESS NOTE ADULT - CARDIOVASCULAR DETAILS
positive S1/positive S2
positive S2/positive S1
positive S2/positive S1
positive S1/positive S2
positive S2/positive S1
positive S1/positive S2
tachycardia/positive S1/positive S2

## 2018-12-17 NOTE — PROGRESS NOTE ADULT - MS EXT PE MLT D E PC
no pedal edema/no cyanosis
no cyanosis/no pedal edema
no pedal edema/no cyanosis
no cyanosis/no pedal edema

## 2018-12-17 NOTE — PROGRESS NOTE ADULT - NEGATIVE GENERAL GENITOURINARY SYMPTOMS
no flank pain L/no dysuria/no hematuria/no flank pain R
no dysuria/no flank pain R/no hematuria/no flank pain L
no dysuria/no flank pain L/no hematuria/no flank pain R
no flank pain L/no dysuria/no flank pain R/no hematuria
no hematuria/no dysuria/no flank pain R/no flank pain L
no dysuria/no hematuria/no flank pain L/no flank pain R
no flank pain R/no hematuria/no flank pain L/no dysuria
no hematuria/no flank pain L/no flank pain R/no dysuria

## 2018-12-17 NOTE — PROGRESS NOTE ADULT - PROBLEM SELECTOR PLAN 1
improved  Distended air-filled esophagus and small hiatus hernia. ,Prominent gas-filled bowel loops seen in the abdomen seen on CXR,  GI eval and recommendation appreciated, c/w supportive care, no EGD at this time   leucocytosis trending down

## 2018-12-17 NOTE — PROGRESS NOTE ADULT - PROVIDER SPECIALTY LIST ADULT
Hospitalist
Infectious Disease
Pulmonology
Pulmonology
Nephrology
Infectious Disease

## 2018-12-17 NOTE — PROGRESS NOTE ADULT - NEGATIVE SKIN SYMPTOMS
no rash/no itching
no rash/no itching
no itching/no rash
no itching/no rash
no rash/no itching
no rash/no itching
no itching/no rash
no rash/no itching

## 2018-12-17 NOTE — PROGRESS NOTE ADULT - NEGATIVE ALLERGIC REACTIONS
no respiratory distress

## 2018-12-17 NOTE — PROGRESS NOTE ADULT - NEGATIVE OPHTHALMOLOGIC SYMPTOMS
no pain R/no pain L
no pain L/no pain R
no pain R/no pain L
no pain R/no pain L
no pain L/no pain R
no pain R/no pain L

## 2018-12-17 NOTE — PROGRESS NOTE ADULT - PROBLEM SELECTOR PLAN 5
Likely pre-renal, now resolved  with IVF  Monitor BMP, avoid nephrotoxic medications, renally dose medications  hypokalemia : resolved with  supplement

## 2018-12-17 NOTE — PROGRESS NOTE ADULT - CONSTITUTIONAL DETAILS
no distress
tired/no distress
no distress
no distress

## 2018-12-17 NOTE — PROGRESS NOTE ADULT - RS GEN PE MLT RESP DETAILS PC
respirations non-labored/good air movement/clear to auscultation bilaterally
respirations non-labored/wheezes
respirations non-labored/clear to auscultation bilaterally
respirations non-labored/breath sounds equal/clear to auscultation bilaterally
respirations non-labored/clear to auscultation bilaterally
respirations non-labored/clear to auscultation bilaterally
wheezing better/respirations non-labored/clear to auscultation bilaterally
clear to auscultation bilaterally/respirations non-labored

## 2018-12-17 NOTE — PROGRESS NOTE ADULT - NEUROLOGICAL DETAILS
responds to verbal commands

## 2018-12-17 NOTE — PROGRESS NOTE ADULT - ATTENDING COMMENTS
d/w family at bedside  DC Planning in am after Abx completed , if stable d/w family at bedside  DC  home with home care  today after Abx completed  Patient hemodynamically stable for discharge home  Time spent in discharge process is 45 min

## 2018-12-17 NOTE — PROGRESS NOTE ADULT - NEGATIVE ALLERGY TYPES
no reactions to medicines

## 2018-12-17 NOTE — PROGRESS NOTE ADULT - PROBLEM SELECTOR PLAN 3
-low grade temp+  -?aspiration given abd distension, ileus, vomiting  -invanz 1 gm iv q24  -complete Invanz today and stop

## 2018-12-17 NOTE — PROGRESS NOTE ADULT - NEGATIVE GENERAL SYMPTOMS
no fever/no chills
no chills/no fever
no chills/no fever
no fever/no chills
no fever/no chills
no chills/no fever
no fever/no chills
no fever/no chills

## 2018-12-17 NOTE — PROGRESS NOTE ADULT - SUBJECTIVE AND OBJECTIVE BOX
Patient is a 89y old  Female who presents with a chief complaint of pain and fever (16 Dec 2018 14:31)      SUBJECTIVE / OVERNIGHT EVENTS:    MEDICATIONS  (STANDING):  amLODIPine   Tablet 5 milliGRAM(s) Oral daily  ATENolol  Tablet 25 milliGRAM(s) Oral daily  dextrose 5% + sodium chloride 0.9%. 1000 milliLiter(s) (50 mL/Hr) IV Continuous <Continuous>  docusate sodium 100 milliGRAM(s) Oral two times a day  enoxaparin Injectable 30 milliGRAM(s) SubCutaneous every 24 hours  ertapenem  IVPB 1000 milliGRAM(s) IV Intermittent every 24 hours  ferrous    sulfate 325 milliGRAM(s) Oral daily  metoclopramide Injectable 5 milliGRAM(s) IV Push once  senna 2 Tablet(s) Oral at bedtime  tiotropium 18 MICROgram(s) Capsule 1 Capsule(s) Inhalation daily    MEDICATIONS  (PRN):  ALBUTerol/ipratropium for Nebulization 3 milliLiter(s) Nebulizer every 4 hours PRN sob/wheezing      Vital Signs Last 24 Hrs  T(C): 37.3 (17 Dec 2018 05:09), Max: 37.3 (17 Dec 2018 05:09)  T(F): 99.1 (17 Dec 2018 05:09), Max: 99.1 (17 Dec 2018 05:09)  HR: 90 (17 Dec 2018 05:09) (68 - 90)  BP: 137/68 (17 Dec 2018 05:09) (131/65 - 147/53)  BP(mean): --  RR: 18 (17 Dec 2018 05:09) (18 - 18)  SpO2: 100% (17 Dec 2018 05:09) (97% - 100%)  CAPILLARY BLOOD GLUCOSE        I&O's Summary      PHYSICAL EXAM:  GENERAL: NAD, well-developed  HEAD:  Atraumatic, Normocephalic  EYES: EOMI, PERRLA, conjunctiva and sclera clear  NECK: Supple, No JVD  CHEST/LUNG: Clear to auscultation bilaterally; No wheeze  HEART: Regular rate and rhythm; No murmurs, rubs, or gallops  ABDOMEN: Soft, Nontender, Nondistended; Bowel sounds present  EXTREMITIES:  2+ Peripheral Pulses, No clubbing, cyanosis, or edema  PSYCH: AAOx3  NEUROLOGY: non-focal  SKIN: No rashes or lesions    LABS:                        10.4   10.71 )-----------( 546      ( 17 Dec 2018 06:50 )             33.7     12-17    136  |  100  |  9   ----------------------------<  100<H>  3.8   |  26  |  0.46<L>    Ca    8.5      17 Dec 2018 06:50                RADIOLOGY & ADDITIONAL TESTS:    Imaging Personally Reviewed:    Consultant(s) Notes Reviewed:      Care Discussed with Consultants/Other Providers: Patient is a 89y old  Female who presents with a chief complaint of pain and fever (16 Dec 2018 14:31)      SUBJECTIVE / OVERNIGHT EVENTS: patient seen and examined by bedside at 10;05 Am, no acute distress noted  no acute events over night        MEDICATIONS  (STANDING):  amLODIPine   Tablet 5 milliGRAM(s) Oral daily  ATENolol  Tablet 25 milliGRAM(s) Oral daily  dextrose 5% + sodium chloride 0.9%. 1000 milliLiter(s) (50 mL/Hr) IV Continuous <Continuous>  docusate sodium 100 milliGRAM(s) Oral two times a day  enoxaparin Injectable 30 milliGRAM(s) SubCutaneous every 24 hours  ertapenem  IVPB 1000 milliGRAM(s) IV Intermittent every 24 hours  ferrous    sulfate 325 milliGRAM(s) Oral daily  metoclopramide Injectable 5 milliGRAM(s) IV Push once  senna 2 Tablet(s) Oral at bedtime  tiotropium 18 MICROgram(s) Capsule 1 Capsule(s) Inhalation daily    MEDICATIONS  (PRN):  ALBUTerol/ipratropium for Nebulization 3 milliLiter(s) Nebulizer every 4 hours PRN sob/wheezing      Vital Signs Last 24 Hrs  T(C): 37.3 (17 Dec 2018 05:09), Max: 37.3 (17 Dec 2018 05:09)  T(F): 99.1 (17 Dec 2018 05:09), Max: 99.1 (17 Dec 2018 05:09)  HR: 90 (17 Dec 2018 05:09) (68 - 90)  BP: 137/68 (17 Dec 2018 05:09) (131/65 - 147/53)  BP(mean): --  RR: 18 (17 Dec 2018 05:09) (18 - 18)  SpO2: 100% (17 Dec 2018 05:09) (97% - 100%)  CAPILLARY BLOOD GLUCOSE        I&O's Summary    PHYSICAL EXAM  GENERAL: Elderly fail woman in bed, appears comfortable  HEAD:  Atraumatic, Normocephalic  EYES: EOMI, PERRLA, conjunctiva and sclera clear  NECK: Supple,   CHEST/LUNG: nonlabored in breathing, no wheezing    HEART: Regular rate and rhythm  ABDOMEN: Nontender, mildly  distended; Bowel sounds hypoactive   EXTREMITIES:  2+ Peripheral Pulses, No clubbing, cyanosis, or edema, no calf tenderness   PSYCH: awake, alert, pleasant         LABS:                        10.4   10.71 )-----------( 546      ( 17 Dec 2018 06:50 )             33.7     12-17    136  |  100  |  9   ----------------------------<  100<H>  3.8   |  26  |  0.46<L>    Ca    8.5      17 Dec 2018 06:50                    Consultant(s) Notes Reviewed:  GI, ID

## 2019-02-19 NOTE — PROGRESS NOTE ADULT - GEN GEN HX ROS MEA POS PC
Report received from JOSE EDUARDO Mckenzie. Pt. A+Ox3, laying down in stretcher, no apparent distress. Breathing unlabored on RA. Comfort and safety measures in place. Pending OBGYN evaluation.
fatigue
malaise/fatigue
fatigue
fatigue

## 2020-12-15 PROBLEM — N76.0 BACTERIAL VAGINOSIS: Status: RESOLVED | Noted: 2017-07-24 | Resolved: 2020-12-15

## 2021-03-24 NOTE — PROGRESS NOTE ADULT - PROBLEM SELECTOR PLAN 4
EMERGENCY DEPARTMENT ENCOUNTER    Room Number:  24/24  Date seen:  11/12/2019  Time seen: 3:16 PM  PCP: Provider, No Known    HPI:  Chief complaint: SOA  Context:Swetha Luis is a 59 y.o. female who presents to the ED with c/o gradual moderate constant SOA due to congestion that started two days ago. Pt admits to nausea and vomiting x 1 and dry cough but denies drainage from trach, fever, chills, or abdominal pain. Pt states she is normally on 8 liters of oxygen at home.     Onset: gradual  Duration: two days  Timing: constant  Character:SOA  Aggravating Factors: congestion  Alleviating Factors: none  Severity: moderate      MEDICAL RECORD REVIEW   Admitted 10/18/19-10/20/19 for chronic respiratory failure with hypoxia and acute tracheobronchitis.   Admitted 10/4/-10/14/19 for HCAP, ureteral stones, and hydronephrosis. Pt underwent cystoscopy and stenting. Pt was dx on Invanz.       ALLERGIES  Cephalexin; Hydrocodone; Strawberry; and Zithromax [azithromycin]    PAST MEDICAL HISTORY  Active Ambulatory Problems     Diagnosis Date Noted   • Tracheostomy complication (CMS/[facility]) 12/18/2018   • Gangrene of toe (CMS/[facility]) 12/20/2018   • Acute diastolic congestive heart failure (CMS/[facility]) 12/24/2018   • ARF (acute renal failure) (CMS/[facility]) 12/24/2018   • Stage 3 chronic kidney disease (CMS/[facility]) 12/24/2018   • Elevated troponin 12/24/2018   • Acute respiratory failure with hypoxemia (CMS/[facility]) 12/24/2018   • Acute osteomyelitis (CMS/[facility]) 12/26/2018   • UTI due to extended-spectrum beta lactamase (ESBL) producing Escherichia coli 12/27/2018   • Thrush, oral 12/28/2018   • Tracheostomy malfunction (CMS/[facility]) 07/10/2019   • COPD (chronic obstructive pulmonary disease) (CMS/[facility]) 07/10/2019   • Chronic respiratory failure with hypoxia (CMS/[facility]) 07/10/2019   • PEG (percutaneous endoscopic gastrostomy) status (CMS/[facility]) 07/10/2019   • History of osteomyelitis 07/10/2019   • Polysubstance abuse (CMS/[facility]) 07/10/2019   • History of tracheal  stenosis 07/10/2019   • Chronic diastolic CHF (congestive heart failure) (CMS/HCC) 07/10/2019   • Peripheral artery disease (CMS/HCC) 07/10/2019   • Medically noncompliant 07/10/2019   • WENDI and COPD overlap syndrome (CMS/HCC) 07/10/2019   • Dyspnea 09/22/2019   • Elevated LFTs 09/22/2019   • Elevated troponin 09/22/2019   • Tracheostomy present (CMS/HCC) 09/22/2019   • Essential hypertension 09/22/2019   • Dysphagia 09/22/2019   • Healthcare associated bacterial pneumonia 10/04/2019   • Infection due to ESBL-producing Escherichia coli 10/06/2019   • Sepsis due to Gram negative bacteria (CMS/HCC) 10/06/2019   • Ureteral stone with hydronephrosis 10/08/2019   • UTI (urinary tract infection) 10/08/2019   • Acute tracheobronchitis 10/19/2019   • Chronic diastolic CHF (congestive heart failure) (CMS/HCC) 10/19/2019   • Bacteremia 10/19/2019     Resolved Ambulatory Problems     Diagnosis Date Noted   • No Resolved Ambulatory Problems     Past Medical History:   Diagnosis Date   • Acute congestive heart failure (CMS/[facility]) 12/24/2018   • Acute osteomyelitis (CMS/HCC)    • Acute renal failure on dialysis (CMS/HCC)    • Anxiety    • Pugh esophagus    • CKD (chronic kidney disease)    • COPD (chronic obstructive pulmonary disease) (CMS/HCC)    • MRSA infection    • Nontraumatic subarachnoid hemorrhage (CMS/HCC)    • Seizures (CMS/HCC)    • Tracheostomy present (CMS/HCC)        PAST SURGICAL HISTORY  Past Surgical History:   Procedure Laterality Date   • TRACHEOSTOMY     • URETEROSCOPY LASER LITHOTRIPSY WITH STENT INSERTION Left 10/11/2019    Procedure: CYSTOSCOPY,  URETEROSCOPY, LEFT RETROGRADE, LEFT PYELOGRAM, LASER LITHOTRIPSY, PLACEMENT OF STENT.;  Surgeon: Clyde Selby MD;  Location: [facility];  Service: Urology       FAMILY HISTORY  Family History   Problem Relation Age of Onset   • Diabetes Mother    • Hypertension Mother        SOCIAL HISTORY  Social History     Socioeconomic History   • Marital status:       Spouse name: Not on file   • Number of children: Not on file   • Years of education: Not on file   • Highest education level: Not on file   Tobacco Use   • Smoking status: Former Smoker     Packs/day: 1.00     Types: Cigarettes     Last attempt to quit: 2018     Years since quittin.3   • Smokeless tobacco: Never Used   Substance and Sexual Activity   • Alcohol use: No     Frequency: Never   • Drug use: Yes     Types: Cocaine     Comment: 20 years ago occasional   • Sexual activity: Defer       REVIEW OF SYSTEMS  Review of Systems   Constitutional: Negative for chills and fever.   HENT: Positive for congestion.    Eyes: Negative.    Respiratory: Positive for cough (dry) and shortness of breath.    Cardiovascular: Negative for chest pain.   Gastrointestinal: Positive for nausea and vomiting (x1). Negative for abdominal pain.   Genitourinary: Negative.    Musculoskeletal: Negative.    Skin: Negative.    Neurological: Negative.    Psychiatric/Behavioral: Negative.        PHYSICAL EXAM  ED Triage Vitals   Temp Heart Rate Resp BP SpO2   19 1359 19 1359 19 1359 19 1359 19 1359   99.2 °F (37.3 °C) 112 18 123/83 100 %      Temp src Heart Rate Source Patient Position BP Location FiO2 (%)   19 1359 19 1359 19 1430 19 1430 --   Tympanic Monitor Sitting Right arm      Physical Exam   Constitutional: She is oriented to person, place, and time and well-developed, well-nourished, and in no distress. No distress.   HENT:   Head: Normocephalic and atraumatic.   Right Ear: External ear normal.   Left Ear: External ear normal.   Nose: Nose normal.   Eyes: Conjunctivae are normal.   Neck: Normal range of motion.   Cardiovascular: Normal rate and regular rhythm.   Pulmonary/Chest: Effort normal. She has wheezes (scant).   Course breath sounds. Dry cough present  Trach collar in place with some crusted brown discharge but no active discharge. Site is dry.    Abdominal: Soft. She exhibits no distension. There is no tenderness.   G tube in LUQ   Musculoskeletal: Normal range of motion. She exhibits no edema (LE).   Neurological: She is alert and oriented to person, place, and time.   Follows commands   Skin: Skin is warm and dry.   Psychiatric: Affect normal.   Nursing note and vitals reviewed.      LAB RESULTS  Recent Results (from the past 24 hour(s))   Light Blue Top    Collection Time: 11/12/19  2:43 PM   Result Value Ref Range    Extra Tube hold for add-on    Green Top (Gel)    Collection Time: 11/12/19  2:43 PM   Result Value Ref Range    Extra Tube Hold for add-ons.    Lavender Top    Collection Time: 11/12/19  2:43 PM   Result Value Ref Range    Extra Tube hold for add-on    Gold Top - SST    Collection Time: 11/12/19  2:43 PM   Result Value Ref Range    Extra Tube Hold for add-ons.    Comprehensive Metabolic Panel    Collection Time: 11/12/19  2:43 PM   Result Value Ref Range    Glucose 84 65 - 99 mg/dL    BUN 21 (H) 6 - 20 mg/dL    Creatinine 1.09 (H) 0.57 - 1.00 mg/dL    Sodium 139 136 - 145 mmol/L    Potassium 4.0 3.5 - 5.2 mmol/L    Chloride 102 98 - 107 mmol/L    CO2 28.2 22.0 - 29.0 mmol/L    Calcium 9.0 8.6 - 10.5 mg/dL    Total Protein 7.1 6.0 - 8.5 g/dL    Albumin 3.50 3.50 - 5.20 g/dL    ALT (SGPT) 26 1 - 33 U/L    AST (SGOT) 16 1 - 32 U/L    Alkaline Phosphatase 177 (H) 39 - 117 U/L    Total Bilirubin 0.3 0.2 - 1.2 mg/dL    eGFR Non African Amer 51 (L) >60 mL/min/1.73    Globulin 3.6 gm/dL    A/G Ratio 1.0 g/dL    BUN/Creatinine Ratio 19.3 7.0 - 25.0    Anion Gap 8.8 5.0 - 15.0 mmol/L   Troponin    Collection Time: 11/12/19  2:43 PM   Result Value Ref Range    Troponin T 0.015 0.000 - 0.030 ng/mL   BNP    Collection Time: 11/12/19  2:43 PM   Result Value Ref Range    proBNP 196.9 5.0 - 900.0 pg/mL   Procalcitonin    Collection Time: 11/12/19  2:43 PM   Result Value Ref Range    Procalcitonin 0.05 (L) 0.10 - 0.25 ng/mL   CBC Auto Differential     Collection Time: 11/12/19  2:43 PM   Result Value Ref Range    WBC 10.19 3.40 - 10.80 10*3/mm3    RBC 4.82 3.77 - 5.28 10*6/mm3    Hemoglobin 12.5 12.0 - 15.9 g/dL    Hematocrit 40.0 34.0 - 46.6 %    MCV 83.0 79.0 - 97.0 fL    MCH 25.9 (L) 26.6 - 33.0 pg    MCHC 31.3 (L) 31.5 - 35.7 g/dL    RDW 15.7 (H) 12.3 - 15.4 %    RDW-SD 47.4 37.0 - 54.0 fl    MPV 11.8 6.0 - 12.0 fL    Platelets 195 140 - 450 10*3/mm3    Neutrophil % 69.0 42.7 - 76.0 %    Lymphocyte % 20.5 19.6 - 45.3 %    Monocyte % 6.3 5.0 - 12.0 %    Eosinophil % 3.4 0.3 - 6.2 %    Basophil % 0.4 0.0 - 1.5 %    Immature Grans % 0.4 0.0 - 0.5 %    Neutrophils, Absolute 7.03 (H) 1.70 - 7.00 10*3/mm3    Lymphocytes, Absolute 2.09 0.70 - 3.10 10*3/mm3    Monocytes, Absolute 0.64 0.10 - 0.90 10*3/mm3    Eosinophils, Absolute 0.35 0.00 - 0.40 10*3/mm3    Basophils, Absolute 0.04 0.00 - 0.20 10*3/mm3    Immature Grans, Absolute 0.04 0.00 - 0.05 10*3/mm3    nRBC 0.0 0.0 - 0.2 /100 WBC   Lactic Acid, Plasma    Collection Time: 11/12/19  3:33 PM   Result Value Ref Range    Lactate 0.9 0.5 - 2.0 mmol/L   Respiratory Panel, PCR - Swab, Nasopharynx    Collection Time: 11/12/19  3:34 PM   Result Value Ref Range    ADENOVIRUS, PCR Not Detected Not Detected    Coronavirus 229E Not Detected Not Detected    Coronavirus HKU1 Not Detected Not Detected    Coronavirus NL63 Not Detected Not Detected    Coronavirus OC43 Not Detected Not Detected    Human Metapneumovirus Not Detected Not Detected    Human Rhinovirus/Enterovirus Not Detected Not Detected    Influenza B PCR Not Detected Not Detected    Parainfluenza Virus 1 Not Detected Not Detected    Parainfluenza Virus 2 Not Detected Not Detected    Parainfluenza Virus 3 Not Detected Not Detected    Parainfluenza Virus 4 Not Detected Not Detected    Bordetella pertussis pcr Not Detected Not Detected    Influenza A H1 2009 PCR Not Detected Not Detected    Chlamydophila pneumoniae PCR Not Detected Not Detected    Mycoplasma pneumo  by PCR Not Detected Not Detected    Influenza A PCR Not Detected Not Detected    Influenza A H3 Not Detected Not Detected    Influenza A H1 Not Detected Not Detected    RSV, PCR Not Detected Not Detected    Bordetella parapertussis PCR Not Detected Not Detected       I ordered the above labs and reviewed the results    RADIOLOGY  XR Chest 1 View   FINDINGS: The lungs are hyperinflated and clear. There is mild  cardiomegaly and the overall appearance shows no change from 10/18/2019.  A tracheostomy tube remains in place.     This report was finalized on 11/12/2019 4:22 PM by Dr. Mike Rivas M.D.          I ordered the above noted radiological studies and reviewed the images on the PACS system.      MEDICATIONS GIVEN IN ER  Medications   sodium chloride 0.9 % flush 10 mL (not administered)   ipratropium-albuterol (DUO-NEB) nebulizer solution 3 mL (3 mL Nebulization Given 11/12/19 1610)   methylPREDNISolone sodium succinate (SOLU-Medrol) injection 125 mg (125 mg Intravenous Given 11/12/19 1803)   ipratropium-albuterol (DUO-NEB) nebulizer solution 3 mL (3 mL Nebulization Given 11/12/19 1807)       EKG  Interpreted by ED Physician, Dr Rodriguez.    PROCEDURES  Procedures      PROGRESS AND CONSULTS    Progress Notes:    ED Course as of Nov 12 1830   Tue Nov 12, 2019   1643 WBC: 10.19 [TD]   1643 Lactate: 0.9 [TD]   1649 EKG interpreted by myself.  Time 1434.  Sinus rhythm.  Heart rate 105.  Normal axis.  Normal intervals.  Left atrial abnormality.  No acute ST abnormalities.  [TD]      ED Course User Index  [TD] Travis Rodriguez II, MD         1523  Nurse states pt was 96% on room air on arrival to ED.    1600   Reviewed pt's history and workup with Dr. Rodriguez.  After a bedside evaluation; Dr Rodriguez agrees with the plan of care    1625  RT states they were successful in suctioning the pt's trach. States pt claims she feels back to normal at this time.    1746  Rechecked patient who is resting comfortably. On reexam pt  "is wheezing more than on original exam.  Discussed all lab and test results. Discussed plan to do another breathing treatment and attempt to ambulate the pt after giving solumedrol. Pt understands and agrees with the plan. All questions have been answered.    1830  Nurse tech states pt was 94% on ambulation.   Discussed plan to discharge the pt. Pt understands and agrees with the plan. All questions have been answered.      Disposition vitals:  /89   Pulse (!) 121   Temp 99.2 °F (37.3 °C) (Tympanic)   Resp 20   Ht 167.6 cm (66\")   Wt 74.9 kg (165 lb 3.2 oz)   SpO2 100%   BMI 26.66 kg/m²       DIAGNOSIS  Final diagnoses:   COPD exacerbation (CMS/[facility])       FOLLOW UP   Kyler Villela MD  [address] [address]  [phone]      Pulmonologist    [address] [place] [address]  [phone]  Schedule an appointment as soon as possible for a visit in 2 days        RX     Medication List      New Prescriptions    predniSONE 20 MG tablet  Commonly known as:  DELTASONE  3 tabs po once daily on days 1-2  2 tabs po once daily on days 3-4  1 tab po once daily on days 5-6  1/2 tab once daily on days 7-8              Documentation assistance provided by emilia Tineo for Charline Traore PA-C.  Information recorded by the scribe was done at my direction and has been verified and validated by me.         Latoya Tineo  11/12/19 1831       Charline Traore PA  11/12/19 2114    " Metabolic encephalopathy due to sepsis 2/2 E. Coli bacteremia likely from UTI  Mental status improving with abx, continue to monitor 84

## 2022-10-17 ENCOUNTER — APPOINTMENT (OUTPATIENT)
Dept: FAMILY MEDICINE | Facility: CLINIC | Age: 87
End: 2022-10-17

## 2022-10-17 ENCOUNTER — NON-APPOINTMENT (OUTPATIENT)
Age: 87
End: 2022-10-17

## 2022-10-17 VITALS
BODY MASS INDEX: 19.48 KG/M2 | DIASTOLIC BLOOD PRESSURE: 85 MMHG | SYSTOLIC BLOOD PRESSURE: 125 MMHG | HEART RATE: 97 BPM | OXYGEN SATURATION: 100 % | TEMPERATURE: 97.2 F | WEIGHT: 90 LBS

## 2022-10-17 DIAGNOSIS — Z23 ENCOUNTER FOR IMMUNIZATION: ICD-10-CM

## 2022-10-17 DIAGNOSIS — Z84.0 FAMILY HISTORY OF DISEASES OF THE SKIN AND SUBCUTANEOUS TISSUE: ICD-10-CM

## 2022-10-17 DIAGNOSIS — Z82.49 FAMILY HISTORY OF ISCHEMIC HEART DISEASE AND OTHER DISEASES OF THE CIRCULATORY SYSTEM: ICD-10-CM

## 2022-10-17 PROCEDURE — 99204 OFFICE O/P NEW MOD 45 MIN: CPT | Mod: 25

## 2022-10-17 PROCEDURE — 90662 IIV NO PRSV INCREASED AG IM: CPT

## 2022-10-17 PROCEDURE — G0008: CPT

## 2022-10-17 NOTE — HEALTH RISK ASSESSMENT
[Some assistance needed] : feeding [Full assistance needed] : managing finances [No falls in past year] : Patient reported no falls in the past year [Patient not ambulatory] : Patient is not ambulatory [Medical reason not done] : Medical reason not done [With Patient/Caregiver] : , with patient/caregiver [Comfort care only] : comfort care only [DNR] : DNR [DNI] : DNI [de-identified] : Pt with dementia/ does not comprehend questions [AdvancecareDate] : 10/22

## 2022-10-17 NOTE — HISTORY OF PRESENT ILLNESS
[Family Member] : family member [FreeTextEntry8] : cc-- establish care\par \par 89 yo F with hx mobility impairment, HTN, presents to establish care/[facility]AYLEN form completion. Pt had vertebral fracture about 10 years ago, was placed on calcitonin and calcium/vit D for bone density. Some days she is in pain, some days she is fine. Lately, she is mostly sitting in transport chair or bed. They do not have wheelchair. Daughters try to get her up to take a few steps to the commode. Lately it has been tough even with that. She needs help with dressing as well. \par \par Daughter reports that she needs around the clock care starting about 1.5-2 years ago after loss of . Daughter reports now she's in "dream state" a lot-- talking to people she knew as a child, talking to her late . She does have lucid moments but not as much. "dream state" is not worse during the day or night but occurs throughout the day. Daughters do not classify as hallucinations or confusion. Pt is not very verbal. She can speak but does not say much. She cannot self direct but can call for help or ask for food. \par \par HTN-- BPs vary-- average at home 130-140s/80s-90s. Takes amlodipine 2.5mg.

## 2022-10-17 NOTE — PHYSICAL EXAM
[Cachectic] : cachexia was observed [Normal] : normal rate, regular rhythm, normal S1 and S2 and no murmur heard [Kyphosis] : kyphosis [Flat] : flat [de-identified] : wheelchair bound [de-identified] : non verbal during encounter

## 2022-10-19 ENCOUNTER — NON-APPOINTMENT (OUTPATIENT)
Age: 87
End: 2022-10-19

## 2022-10-19 LAB — BACTERIA UR CULT: NORMAL

## 2022-12-23 DIAGNOSIS — K59.00 CONSTIPATION, UNSPECIFIED: ICD-10-CM

## 2023-04-13 RX ORDER — SENNOSIDES 8.6 MG TABLETS 8.6 MG/1
8.6 TABLET ORAL
Qty: 1 | Refills: 1 | Status: ACTIVE | COMMUNITY
Start: 2022-12-23 | End: 1900-01-01

## 2023-05-04 ENCOUNTER — RX RENEWAL (OUTPATIENT)
Age: 88
End: 2023-05-04

## 2023-09-11 ENCOUNTER — RX RENEWAL (OUTPATIENT)
Age: 88
End: 2023-09-11

## 2023-09-20 ENCOUNTER — RX RENEWAL (OUTPATIENT)
Age: 88
End: 2023-09-20

## 2023-10-11 ENCOUNTER — APPOINTMENT (OUTPATIENT)
Dept: INTERNAL MEDICINE | Facility: CLINIC | Age: 88
End: 2023-10-11

## 2023-10-12 ENCOUNTER — APPOINTMENT (OUTPATIENT)
Dept: INTERNAL MEDICINE | Facility: CLINIC | Age: 88
End: 2023-10-12
Payer: MEDICARE

## 2023-10-12 VITALS
HEIGHT: 57 IN | BODY MASS INDEX: 19.41 KG/M2 | DIASTOLIC BLOOD PRESSURE: 61 MMHG | HEART RATE: 81 BPM | SYSTOLIC BLOOD PRESSURE: 132 MMHG | TEMPERATURE: 97.6 F | WEIGHT: 90 LBS | OXYGEN SATURATION: 98 %

## 2023-10-12 DIAGNOSIS — M81.0 AGE-RELATED OSTEOPOROSIS W/OUT CURRENT PATHOLOGICAL FRACTURE: ICD-10-CM

## 2023-10-12 DIAGNOSIS — F03.90 UNSPECIFIED DEMENTIA W/OUT BEHAVIORAL DISTURBANCE: ICD-10-CM

## 2023-10-12 DIAGNOSIS — I10 ESSENTIAL (PRIMARY) HYPERTENSION: ICD-10-CM

## 2023-10-12 DIAGNOSIS — R26.89 OTHER ABNORMALITIES OF GAIT AND MOBILITY: ICD-10-CM

## 2023-10-12 PROCEDURE — 99214 OFFICE O/P EST MOD 30 MIN: CPT | Mod: 25

## 2023-10-12 PROCEDURE — 90662 IIV NO PRSV INCREASED AG IM: CPT

## 2023-10-12 PROCEDURE — G0008: CPT

## 2023-10-13 LAB
25(OH)D3 SERPL-MCNC: 55.1 NG/ML
ALBUMIN SERPL ELPH-MCNC: 4.1 G/DL
ALP BLD-CCNC: 65 U/L
ALT SERPL-CCNC: 16 U/L
ANION GAP SERPL CALC-SCNC: 10 MMOL/L
AST SERPL-CCNC: 25 U/L
BILIRUB SERPL-MCNC: 0.3 MG/DL
BUN SERPL-MCNC: 13 MG/DL
CALCIUM SERPL-MCNC: 10 MG/DL
CHLORIDE SERPL-SCNC: 90 MMOL/L
CO2 SERPL-SCNC: 26 MMOL/L
CREAT SERPL-MCNC: 0.44 MG/DL
EGFR: 90 ML/MIN/1.73M2
GLUCOSE SERPL-MCNC: 84 MG/DL
POTASSIUM SERPL-SCNC: 4.2 MMOL/L
PROT SERPL-MCNC: 7 G/DL
SODIUM SERPL-SCNC: 126 MMOL/L

## 2023-11-08 ENCOUNTER — APPOINTMENT (OUTPATIENT)
Dept: INTERNAL MEDICINE | Facility: CLINIC | Age: 88
End: 2023-11-08

## 2023-11-09 ENCOUNTER — APPOINTMENT (OUTPATIENT)
Dept: INTERNAL MEDICINE | Facility: CLINIC | Age: 88
End: 2023-11-09

## 2023-12-06 ENCOUNTER — RX RENEWAL (OUTPATIENT)
Age: 88
End: 2023-12-06

## 2023-12-06 DIAGNOSIS — R53.2 FUNCTIONAL QUADRIPLEGIA: ICD-10-CM

## 2023-12-22 NOTE — HISTORY OF PRESENT ILLNESS
[FreeTextEntry1] : JAYLEN form [de-identified] : 89 yo F with dementia, osteoporosis, HTN presents for JAYLEN form completion. She is essentially wheelchair bound. She can stand for short periods of time to brush her teeth. She does walk around in her room but that is about it. Bedroom and bathrooms are upstairs. Hard for her to go up/down so she spends most of the time upstairs. Stairway is also too small to accommodate for stair lift. They do not actually have a wheelchair, just a transport chair. Needs a new commode too.   Pt is very frail, she will not be able to manually propel a wheelchair. Pt will require wheelchair mostly outdoors, not indoors. Pt really unable to leave confines of room since it is hard for her to go up and down the stairs. Home is not really wheelchair accessible. Pt unable to use walker or cane due to weak upper body.  Eating 3 meals a day. But if she overeats, will vomit. This does not happen often. Not really able to self direct.  Pt on calcitonin spray for osteoporosis. Has not had labs checked. Unclear if she needs to continue this medicine. Family wants to continue despite age and immobility.   BPs-- reports at home, BPs can be high around 140s as well. Family checks BPs often, not usually low. Ok to continue amlodipine 2.5mg.

## 2024-01-18 ENCOUNTER — RX RENEWAL (OUTPATIENT)
Age: 89
End: 2024-01-18

## 2024-01-18 RX ORDER — SENNOSIDES 8.6 MG/1
8.6 TABLET ORAL
Qty: 50 | Refills: 2 | Status: ACTIVE | COMMUNITY
Start: 2023-05-04 | End: 1900-01-01

## 2024-04-29 ENCOUNTER — RX RENEWAL (OUTPATIENT)
Age: 89
End: 2024-04-29

## 2024-04-29 RX ORDER — AMLODIPINE BESYLATE 2.5 MG/1
2.5 TABLET ORAL
Qty: 90 | Refills: 3 | Status: ACTIVE | COMMUNITY
Start: 2022-07-30 | End: 1900-01-01

## 2024-10-26 ENCOUNTER — APPOINTMENT (OUTPATIENT)
Dept: INTERNAL MEDICINE | Facility: CLINIC | Age: 89
End: 2024-10-26
Payer: MEDICARE

## 2024-10-26 VITALS — SYSTOLIC BLOOD PRESSURE: 161 MMHG | TEMPERATURE: 96.3 F | DIASTOLIC BLOOD PRESSURE: 80 MMHG | OXYGEN SATURATION: 97 %

## 2024-10-26 DIAGNOSIS — K59.00 CONSTIPATION, UNSPECIFIED: ICD-10-CM

## 2024-10-26 DIAGNOSIS — Z00.00 ENCOUNTER FOR GENERAL ADULT MEDICAL EXAMINATION W/OUT ABNORMAL FINDINGS: ICD-10-CM

## 2024-10-26 DIAGNOSIS — R26.89 OTHER ABNORMALITIES OF GAIT AND MOBILITY: ICD-10-CM

## 2024-10-26 DIAGNOSIS — I10 ESSENTIAL (PRIMARY) HYPERTENSION: ICD-10-CM

## 2024-10-26 DIAGNOSIS — M81.0 AGE-RELATED OSTEOPOROSIS W/OUT CURRENT PATHOLOGICAL FRACTURE: ICD-10-CM

## 2024-10-26 PROCEDURE — 36415 COLL VENOUS BLD VENIPUNCTURE: CPT

## 2024-10-26 PROCEDURE — G0439: CPT

## 2024-10-31 LAB
25(OH)D3 SERPL-MCNC: 51.3 NG/ML
ALBUMIN SERPL ELPH-MCNC: 4.1 G/DL
ALP BLD-CCNC: 133 U/L
ALT SERPL-CCNC: 17 U/L
ANION GAP SERPL CALC-SCNC: 15 MMOL/L
APPEARANCE: CLEAR
AST SERPL-CCNC: 29 U/L
BASOPHILS # BLD AUTO: 0.03 K/UL
BASOPHILS NFR BLD AUTO: 0.6 %
BILIRUB SERPL-MCNC: 0.3 MG/DL
BILIRUBIN URINE: NEGATIVE
BLOOD URINE: NEGATIVE
BUN SERPL-MCNC: 16 MG/DL
CALCIUM SERPL-MCNC: 9.8 MG/DL
CHLORIDE SERPL-SCNC: 96 MMOL/L
CHOLEST SERPL-MCNC: 209 MG/DL
CO2 SERPL-SCNC: 24 MMOL/L
COLOR: YELLOW
CREAT SERPL-MCNC: 0.41 MG/DL
EGFR: 91 ML/MIN/1.73M2
EOSINOPHIL # BLD AUTO: 0.08 K/UL
EOSINOPHIL NFR BLD AUTO: 1.5 %
ESTIMATED AVERAGE GLUCOSE: 114 MG/DL
GLUCOSE QUALITATIVE U: NEGATIVE MG/DL
GLUCOSE SERPL-MCNC: 84 MG/DL
HBA1C MFR BLD HPLC: 5.6 %
HCT VFR BLD CALC: 36.8 %
HDLC SERPL-MCNC: 97 MG/DL
HGB BLD-MCNC: 12 G/DL
IMM GRANULOCYTES NFR BLD AUTO: 0 %
KETONES URINE: NEGATIVE MG/DL
LDLC SERPL CALC-MCNC: 105 MG/DL
LEUKOCYTE ESTERASE URINE: NEGATIVE
LYMPHOCYTES # BLD AUTO: 1.45 K/UL
LYMPHOCYTES NFR BLD AUTO: 27.6 %
MAN DIFF?: NORMAL
MCHC RBC-ENTMCNC: 22.9 PG
MCHC RBC-ENTMCNC: 32.6 GM/DL
MCV RBC AUTO: 70.4 FL
MONOCYTES # BLD AUTO: 0.48 K/UL
MONOCYTES NFR BLD AUTO: 9.1 %
NEUTROPHILS # BLD AUTO: 3.21 K/UL
NEUTROPHILS NFR BLD AUTO: 61.2 %
NITRITE URINE: NEGATIVE
NONHDLC SERPL-MCNC: 112 MG/DL
PH URINE: 7.5
PLATELET # BLD AUTO: 276 K/UL
POTASSIUM SERPL-SCNC: 4.4 MMOL/L
PROT SERPL-MCNC: 7.1 G/DL
PROTEIN URINE: NEGATIVE MG/DL
RBC # BLD: 5.23 M/UL
RBC # FLD: 16.3 %
SODIUM SERPL-SCNC: 135 MMOL/L
SPECIFIC GRAVITY URINE: 1.01
T3 SERPL-MCNC: 76 NG/DL
T4 SERPL-MCNC: 9.3 UG/DL
TRIGL SERPL-MCNC: 38 MG/DL
TSH SERPL-ACNC: 1.74 UIU/ML
UROBILINOGEN URINE: 0.2 MG/DL
WBC # FLD AUTO: 5.25 K/UL

## 2025-02-15 ENCOUNTER — RX RENEWAL (OUTPATIENT)
Age: 89
End: 2025-02-15

## 2025-03-20 ENCOUNTER — APPOINTMENT (OUTPATIENT)
Dept: INTERNAL MEDICINE | Facility: CLINIC | Age: 89
End: 2025-03-20
Payer: MEDICARE

## 2025-03-20 DIAGNOSIS — R53.2 FUNCTIONAL QUADRIPLEGIA: ICD-10-CM

## 2025-03-20 DIAGNOSIS — R31.0 GROSS HEMATURIA: ICD-10-CM

## 2025-03-20 PROCEDURE — 99213 OFFICE O/P EST LOW 20 MIN: CPT | Mod: 93

## 2025-03-21 DIAGNOSIS — R54 AGE-RELATED PHYSICAL DEBILITY: ICD-10-CM

## 2025-03-24 LAB
APPEARANCE: ABNORMAL
BACTERIA UR CULT: ABNORMAL
BACTERIA: NEGATIVE /HPF
BILIRUBIN URINE: ABNORMAL
BLOOD URINE: ABNORMAL
CAST: 1 /LPF
COLOR: ABNORMAL
EPITHELIAL CELLS: 1 /HPF
GLUCOSE QUALITATIVE U: NEGATIVE MG/DL
HYALINE CASTS: PRESENT
KETONES URINE: NEGATIVE MG/DL
LEUKOCYTE ESTERASE URINE: ABNORMAL
MICROSCOPIC-UA: NORMAL
NITRITE URINE: NEGATIVE
PH URINE: 7.5
PROTEIN URINE: 100 MG/DL
RED BLOOD CELLS URINE: >1900 /HPF
REVIEW: NORMAL
SPECIFIC GRAVITY URINE: 1.01
UROBILINOGEN URINE: 0.2 MG/DL
WHITE BLOOD CELLS URINE: 10 /HPF

## 2025-03-24 RX ORDER — CIPROFLOXACIN HYDROCHLORIDE 500 MG/1
500 TABLET, FILM COATED ORAL
Qty: 10 | Refills: 0 | Status: ACTIVE | COMMUNITY
Start: 2025-03-24 | End: 1900-01-01